# Patient Record
Sex: FEMALE | Race: WHITE | NOT HISPANIC OR LATINO | Employment: UNEMPLOYED | URBAN - METROPOLITAN AREA
[De-identification: names, ages, dates, MRNs, and addresses within clinical notes are randomized per-mention and may not be internally consistent; named-entity substitution may affect disease eponyms.]

---

## 2020-07-13 ENCOUNTER — TELEPHONE (OUTPATIENT)
Dept: RHEUMATOLOGY | Facility: CLINIC | Age: 55
End: 2020-07-13

## 2020-07-13 DIAGNOSIS — M85.89 OTHER SPECIFIED DISORDERS OF BONE DENSITY AND STRUCTURE, MULTIPLE SITES: Primary | ICD-10-CM

## 2020-07-13 NOTE — TELEPHONE ENCOUNTER
Np consult scheduled on 8-3-20 with delfino briseno, dexa scheduled for 12:40pm, appt with delfino 1pm, Verbalized understanding.    Will mail appt slip.

## 2020-07-13 NOTE — TELEPHONE ENCOUNTER
(late entry) Spoke with [place]- this am approx 8:30am, fax received from [facility] for dexa order, need to clarify if pt would like to be seen with Lisa Weber, no referral received.

## 2020-07-13 NOTE — TELEPHONE ENCOUNTER
----- Message from Pablito Kuhn sent at 7/13/2020 11:22 AM CDT -----  Regarding: Appointment Access  Contact: Pt  Pt called in regards to scheduling a NP Bone Health Appointment. Pt can be reached at [phone] (mqag)

## 2020-07-31 ENCOUNTER — TELEPHONE (OUTPATIENT)
Dept: RHEUMATOLOGY | Facility: CLINIC | Age: 55
End: 2020-07-31

## 2020-08-03 ENCOUNTER — OFFICE VISIT (OUTPATIENT)
Dept: RHEUMATOLOGY | Facility: CLINIC | Age: 55
End: 2020-08-03
Payer: COMMERCIAL

## 2020-08-03 ENCOUNTER — APPOINTMENT (OUTPATIENT)
Dept: RADIOLOGY | Facility: HOSPITAL | Age: 55
End: 2020-08-03
Attending: PHYSICIAN ASSISTANT
Payer: COMMERCIAL

## 2020-08-03 VITALS
SYSTOLIC BLOOD PRESSURE: 121 MMHG | HEART RATE: 66 BPM | DIASTOLIC BLOOD PRESSURE: 64 MMHG | WEIGHT: 162.5 LBS | BODY MASS INDEX: 29.9 KG/M2 | HEIGHT: 62 IN

## 2020-08-03 DIAGNOSIS — M81.8 OTHER OSTEOPOROSIS WITHOUT CURRENT PATHOLOGICAL FRACTURE: Primary | ICD-10-CM

## 2020-08-03 DIAGNOSIS — M85.89 OTHER SPECIFIED DISORDERS OF BONE DENSITY AND STRUCTURE, MULTIPLE SITES: ICD-10-CM

## 2020-08-03 PROCEDURE — 99204 PR OFFICE/OUTPT VISIT, NEW, LEVL IV, 45-59 MIN: ICD-10-PCS | Mod: S$GLB,,, | Performed by: PHYSICIAN ASSISTANT

## 2020-08-03 PROCEDURE — 77080 DEXA BONE DENSITY SPINE HIP: ICD-10-PCS | Mod: 26,,, | Performed by: RADIOLOGY

## 2020-08-03 PROCEDURE — 99204 OFFICE O/P NEW MOD 45 MIN: CPT | Mod: S$GLB,,, | Performed by: PHYSICIAN ASSISTANT

## 2020-08-03 PROCEDURE — 99999 PR PBB SHADOW E&M-EST. PATIENT-LVL III: ICD-10-PCS | Mod: PBBFAC,,, | Performed by: PHYSICIAN ASSISTANT

## 2020-08-03 PROCEDURE — 77080 DXA BONE DENSITY AXIAL: CPT | Mod: 26,,, | Performed by: RADIOLOGY

## 2020-08-03 PROCEDURE — 77080 DXA BONE DENSITY AXIAL: CPT | Mod: TC

## 2020-08-03 PROCEDURE — 3008F PR BODY MASS INDEX (BMI) DOCUMENTED: ICD-10-PCS | Mod: CPTII,S$GLB,, | Performed by: PHYSICIAN ASSISTANT

## 2020-08-03 PROCEDURE — 3008F BODY MASS INDEX DOCD: CPT | Mod: CPTII,S$GLB,, | Performed by: PHYSICIAN ASSISTANT

## 2020-08-03 PROCEDURE — 99999 PR PBB SHADOW E&M-EST. PATIENT-LVL III: CPT | Mod: PBBFAC,,, | Performed by: PHYSICIAN ASSISTANT

## 2020-08-03 NOTE — PROGRESS NOTES
Subjective:       Patient ID: Lea Hall is a 54 y.o. female.    Chief Complaint: Consult    Lea is a 55 y/o self referred for evaluation of her bmd.  She has a history of osteoporosis but has never been treated.  She takes a multivitamin for women daily.  No h/o fx.  Here today to establish care.  No falls, steady gait, walks w/o assistance. Her last two dexas showing worsening bmd.       Current meds for osteopenia/ osteoporosis: none  Prior meds for osteopenia/ osteoporosis: none  Prev dexa scan: osteoporosis  Vit D supplement: MV   Menopause: about 6 years   Hystrectomy:   HRT: none  Smoker/tobacco: no  Etoh: none  Falls: no   Activity level: routine exercise   Kidney problems : no   Prev fracture : no   Steroids : h/o on and off steroids for sinus issues   Family history : no   PPI/gerd: no   Other risk factors :    Dental issues:  good   Anticipated dental work :  no   H/o cancer/tx: no         History reviewed. No pertinent past medical history.  Past Surgical History:   Procedure Laterality Date    SINUS SURGERY  12/28/2016     History reviewed. No pertinent family history.  Social History     Socioeconomic History    Marital status:      Spouse name: Not on file    Number of children: Not on file    Years of education: Not on file    Highest education level: Not on file   Occupational History    Not on file   Social Needs    Financial resource strain: Not on file    Food insecurity     Worry: Not on file     Inability: Not on file    Transportation needs     Medical: Not on file     Non-medical: Not on file   Tobacco Use    Smoking status: Not on file   Substance and Sexual Activity    Alcohol use: Not on file    Drug use: Not on file    Sexual activity: Not on file   Lifestyle    Physical activity     Days per week: Not on file     Minutes per session: Not on file    Stress: Not on file   Relationships    Social connections     Talks on phone: Not on file     Gets together:  "Not on file     Attends Mandaeism service: Not on file     Active member of club or organization: Not on file     Attends meetings of clubs or organizations: Not on file     Relationship status: Not on file   Other Topics Concern    Not on file   Social History Narrative    Not on file     Review of patient's allergies indicates:  No Known Allergies  Review of Systems   Constitutional: Negative for chills, fatigue and fever.   HENT: Negative for mouth sores, rhinorrhea and sore throat.    Eyes: Negative for pain and redness.   Respiratory: Negative for cough and shortness of breath.    Cardiovascular: Negative for chest pain.   Gastrointestinal: Negative for abdominal pain, constipation, diarrhea, nausea and vomiting.   Genitourinary: Negative for dysuria and hematuria.   Musculoskeletal: Negative for arthralgias, joint swelling and myalgias.   Skin: Negative for rash.   Neurological: Negative for weakness, numbness and headaches.   Psychiatric/Behavioral: The patient is not nervous/anxious.            Objective:   /64   Pulse 66   Ht 5' 2" (1.575 m)   Wt 73.7 kg (162 lb 7.7 oz)   BMI 29.72 kg/m²     There is no immunization history on file for this patient.           Physical Exam   Constitutional: She is oriented to person, place, and time and well-developed, well-nourished, and in no distress.   HENT:   Head: Normocephalic and atraumatic.   Eyes: Pupils are equal, round, and reactive to light. Right eye exhibits no discharge.   Neck: Normal range of motion.   Cardiovascular: Normal rate, regular rhythm and normal heart sounds.  Exam reveals no friction rub.    Pulmonary/Chest: Effort normal and breath sounds normal. No respiratory distress.   Abdominal: Soft. She exhibits no distension. There is no abdominal tenderness.   Lymphadenopathy:     She has no cervical adenopathy.   Neurological: She is alert and oriented to person, place, and time.   Skin: No rash noted. No erythema.     Psychiatric: Mood " normal.   Musculoskeletal: Normal range of motion. No deformity or edema.      Comments:   Able to get out of chair independently    Steady gait.            No results found for this or any previous visit (from the past 336 hour(s)).     No results found for: TBGOLDPLUS   No results found for: HAV, HEPAIGM, HEPBIGM, HEPBCAB, HBEAG, HEPCAB   dexa 2017: spine -2.9, LN -1.5, LT -1.0 declining   dexa 8.3.20: spine -3.3, LN -2.4, LT -2.4 declining   Assessment:       1. Other osteoporosis without current pathological fracture          Impression:   Osteoporosis: never on tx; no fxs, taking MV daily declining bmd on dexa, taking a MV  Plan:       Discussed osteoporosis/low bone density disease state in detail with patient.  Reviewed treatment options along with potential side effects of these treatments.  Dexa scan was reviewed with patient.  Treatment plan agreed upon together with patient     Labs today: vit D, pth, cmp, mag, phos  rec vit D daily 1000u unless low then will start high dose   rec ca from all sources 1000mg more dietary sources if possible  Encouraged WB activity to continue    Start Prolia 60mg SC q 6 months   New drug discussed with patient including SE, drug info printed and given to patient    Start in 2 wks then f/u q 6 mos for cmp and prolia     Addendum: low vit D on labwork, will start 50K weekly     Addendum #2: 8.26.20  We started fosamax at request of insurance.   Patient started fosamax oral bisphosphonate, started with severe jaw, bone, temple pain, headache, nausea and stomach upset.     Will stop bisphosphonate and try for different MOA Prolia.  Will not move to IV reclast as concern for same bone/jaw pain

## 2020-08-03 NOTE — PATIENT INSTRUCTIONS
Vit D 1000-2000units day vit D3  Calcium 1000mg/day try to get more in diet   Weight bearing activity     Labs today     Prolia start in 2 wks, 60mg SC every 6 months given in infusion (cancer center)    [email or web address] payment info     Repeat dexa scan in a year to monitor therapy     Denosumab injection  What is this medicine?  DENOSUMAB (den oh kris mab) slows bone breakdown. Prolia is used to treat osteoporosis in women after menopause and in men. Xgeva is used to prevent bone fractures and other bone problems caused by cancer bone metastases. Xgeva is also used to treat giant cell tumor of the bone.  How should I use this medicine?  This medicine is for injection under the skin. It is given by a health care professional in a hospital or clinic setting.  If you are getting Prolia, a special MedGuide will be given to you by the pharmacist with each prescription and refill. Be sure to read this information carefully each time.  For Prolia, talk to your pediatrician regarding the use of this medicine in children. Special care may be needed. For Xgeva, talk to your pediatrician regarding the use of this medicine in children. While this drug may be prescribed for children as young as 13 years for selected conditions, precautions do apply.  What side effects may I notice from receiving this medicine?  Side effects that you should report to your doctor or health care professional as soon as possible:  · allergic reactions like skin rash, itching or hives, swelling of the face, lips, or tongue  · breathing problems  · chest pain  · fast, irregular heartbeat  · feeling faint or lightheaded, falls  · fever, chills, or any other sign of infection  · muscle spasms, tightening, or twitches  · numbness or tingling  · skin blisters or bumps, or is dry, peels, or red  · slow healing or unexplained pain in the mouth or jaw  · unusual bleeding or bruising  Side effects that usually do not require medical attention (Report these to your  doctor or health care professional if they continue or are bothersome.):  · muscle pain  · stomach upset, gas  What may interact with this medicine?  Do not take this medicine with any of the following medications:  · other medicines containing denosumab  This medicine may also interact with the following medications:  · medicines that suppress the immune system  · medicines that treat cancer  · steroid medicines like prednisone or cortisone  What if I miss a dose?  It is important not to miss your dose. Call your doctor or health care professional if you are unable to keep an appointment.  Where should I keep my medicine?  This medicine is only given in a clinic, doctor's office, or other health care setting and will not be stored at home.  What should I tell my health care provider before I take this medicine?  They need to know if you have any of these conditions:  · dental disease  · eczema  · infection or history of infections  · kidney disease or on dialysis  · low blood calcium or vitamin D  · malabsorption syndrome  · scheduled to have surgery or tooth extraction  · taking medicine that contains denosumab  · thyroid or parathyroid disease  · an unusual reaction to denosumab, other medicines, foods, dyes, or preservatives  · pregnant or trying to get pregnant  · breast-feeding  What should I watch for while using this medicine?  Visit your doctor or health care professional for regular checks on your progress. Your doctor or health care professional may order blood tests and other tests to see how you are doing.  Call your doctor or health care professional if you get a cold or other infection while receiving this medicine. Do not treat yourself. This medicine may decrease your body's ability to fight infection.  You should make sure you get enough calcium and vitamin D while you are taking this medicine, unless your doctor tells you not to. Discuss the foods you eat and the vitamins you take with your health  care professional.  See your dentist regularly. Brush and floss your teeth as directed. Before you have any dental work done, tell your dentist you are receiving this medicine.  Do not become pregnant while taking this medicine or for 5 months after stopping it. Women should inform their doctor if they wish to become pregnant or think they might be pregnant. There is a potential for serious side effects to an unborn child. Talk to your health care professional or pharmacist for more information.  NOTE:This sheet is a summary. It may not cover all possible information. If you have questions about this medicine, talk to your doctor, pharmacist, or health care provider. Copyright© 2017 [employer]

## 2020-08-04 RX ORDER — ERGOCALCIFEROL 1.25 MG/1
50000 CAPSULE ORAL
Qty: 12 CAPSULE | Refills: 3 | Status: SHIPPED | OUTPATIENT
Start: 2020-08-04 | End: 2023-01-12

## 2020-08-14 ENCOUNTER — TELEPHONE (OUTPATIENT)
Dept: RHEUMATOLOGY | Facility: CLINIC | Age: 55
End: 2020-08-14

## 2020-08-14 ENCOUNTER — TELEPHONE (OUTPATIENT)
Dept: INFUSION THERAPY | Facility: HOSPITAL | Age: 55
End: 2020-08-14

## 2020-08-14 RX ORDER — ALENDRONATE SODIUM 70 MG/1
70 TABLET ORAL
Qty: 4 TABLET | Refills: 11 | Status: SHIPPED | OUTPATIENT
Start: 2020-08-14 | End: 2021-03-22

## 2020-08-14 NOTE — TELEPHONE ENCOUNTER
Let her know we are aware of the insurance denial  We will find out the reason for denial  They likely want her to fail one of the other drugs first.  If that is the case i'll get with her about the new plan.  But give us some time to find out why she was denied.

## 2020-08-14 NOTE — PROGRESS NOTES
Insurance has denied prolia, needs to try bisphosphonate first     Start fosamax/alendronate 70mg once weekly.  Discussed w patient, will see her back in 4 weeks to discuss how she is tolerating.

## 2020-08-14 NOTE — TELEPHONE ENCOUNTER
Spoke w/ pt informed that insurance company denied her prolia injection. Advised pt to expect a call from Lisa Weber's staff to discuss a new plan of action. Pt appreciative of call and verbalized understanding.

## 2020-08-26 ENCOUNTER — TELEPHONE (OUTPATIENT)
Dept: RHEUMATOLOGY | Facility: CLINIC | Age: 55
End: 2020-08-26

## 2020-08-26 ENCOUNTER — DOCUMENTATION ONLY (OUTPATIENT)
Dept: RHEUMATOLOGY | Facility: CLINIC | Age: 55
End: 2020-08-26

## 2020-08-26 NOTE — PROGRESS NOTES
Patient started fosamax oral bisphosphonate, started with severe jaw, bone, temple pain, headache, nausea and stomach upset.     Will stop bisphosphonate and try for different MOA Prolia.  Will not move to IV reclast as concern for same bone/jaw pain

## 2020-08-26 NOTE — TELEPHONE ENCOUNTER
Advised pt of info below, Verbalized understanding, Stated she would like to try prolia, appts scheduled on 9.21.20

## 2020-08-26 NOTE — TELEPHONE ENCOUNTER
----- Message from Martha Vasquez sent at 8/26/2020  8:11 AM CDT -----  Type:  Needs Medical Advice    Who Called: DEDRA HALL   Symptoms (please be specific):   How long has patient had these symptoms:   Pharmacy name and phone #:   Would the patient rather a call back or a response via [facility]? Call   Best Call Back Number:  [phone] (home)    Additional Information:   Pt is requesting a call back from the nurse in regards to the pt med please

## 2020-08-26 NOTE — TELEPHONE ENCOUNTER
Pt states she started fosamax, c/o jaw soreness/temple, stomach upset, states she's unsure if it's sinus related, would like to know if going to her chiropractor is good, she was told it would help her bone density with doing vibrating machine.    Please advise.

## 2020-08-26 NOTE — TELEPHONE ENCOUNTER
Lisa Weber PA-C  You 6 minutes ago (1:23 PM)     Tell her to stop her fosamax.  I don't have solid data supporting the vibration therapy so I can't speak for or against it.  Given her side effects from fosamax I would like to try again for prolia, likely that she will tolerate that better.  Is she ok if I try to get that approved for her September visit?   Would need to add a cmp for that day and the prolia appt visit.       Let me know and i'll message infusion as well.      Message text

## 2020-08-28 ENCOUNTER — TELEPHONE (OUTPATIENT)
Dept: RHEUMATOLOGY | Facility: CLINIC | Age: 55
End: 2020-08-28

## 2020-08-28 NOTE — TELEPHONE ENCOUNTER
----- Message from Mercedez Ojeda sent at 8/28/2020 12:55 PM CDT -----  Contact: pt  The pt request a return call, no additional info given and can be reached at [phone]///thxMW

## 2020-08-28 NOTE — TELEPHONE ENCOUNTER
GIOVANNI Parkinson 2 days ago     Will you see what you can do with this please.     Message text       Lisa Weber PA-C  [facility] Clinical Support; Porsha Davey LPN 2 days ago     Going to try to get her prolia approved again.  She has tried bisphosphonate and has failed due to side effects, not a candidate for reclast.  Have her scheduled for 9.21.20     Message text

## 2020-08-28 NOTE — TELEPHONE ENCOUNTER
Spoke with pt, states she received a bill for $321 for labs/bone density scan & f/u appt. States she was told it can be covered at [facility].    Will fax lab orders to [facility] in [place] per pt request for upcoming appt on 9.21.20

## 2020-09-09 ENCOUNTER — TELEPHONE (OUTPATIENT)
Dept: RHEUMATOLOGY | Facility: CLINIC | Age: 55
End: 2020-09-09

## 2020-09-09 NOTE — TELEPHONE ENCOUNTER
----- Message from Mercedez Ojeda sent at 9/9/2020  7:43 AM CDT -----  Contact: pt  The pt request a return call, no additional info given and can be reached at [phone]///thxMW

## 2020-09-09 NOTE — TELEPHONE ENCOUNTER
cmp lab order faxed to [facility] #[phone]    Phone # [phone]

## 2020-09-09 NOTE — TELEPHONE ENCOUNTER
Pt states she would like to do external labs at [facility] on [facility] in [place], will get them done this Friday, Advised I can fax over, requesting call back on Friday to confirm lab appt, Verbalized understanding.

## 2020-09-10 ENCOUNTER — TELEPHONE (OUTPATIENT)
Dept: RHEUMATOLOGY | Facility: CLINIC | Age: 55
End: 2020-09-10

## 2020-09-10 DIAGNOSIS — Z51.81 MEDICATION MONITORING ENCOUNTER: ICD-10-CM

## 2020-09-10 DIAGNOSIS — M81.8 OTHER OSTEOPOROSIS WITHOUT CURRENT PATHOLOGICAL FRACTURE: Primary | ICD-10-CM

## 2020-09-10 NOTE — TELEPHONE ENCOUNTER
Advise pt lab orders re faxed to [facility] today, Verbalized understanding, stated she will go tomorrow.

## 2020-09-10 NOTE — TELEPHONE ENCOUNTER
----- Message from Amparo Zamudio sent at 9/10/2020  1:31 PM CDT -----  Pt is requesting to speak with the nurse regarding lab order. Please call back at [phone]

## 2020-09-12 LAB
ALBUMIN SERPL-MCNC: 4.5 G/DL (ref 3.8–4.9)
ALBUMIN/GLOB SERPL: 2 {RATIO} (ref 1.2–2.2)
ALP SERPL-CCNC: 57 IU/L (ref 39–117)
ALT SERPL-CCNC: 19 IU/L (ref 0–32)
AST SERPL-CCNC: 20 IU/L (ref 0–40)
BILIRUB SERPL-MCNC: 0.5 MG/DL (ref 0–1.2)
BUN SERPL-MCNC: 15 MG/DL (ref 6–24)
BUN/CREAT SERPL: 23 (ref 9–23)
CALCIUM SERPL-MCNC: 9.2 MG/DL (ref 8.7–10.2)
CHLORIDE SERPL-SCNC: 103 MMOL/L (ref 96–106)
CO2 SERPL-SCNC: 24 MMOL/L (ref 20–29)
CREAT SERPL-MCNC: 0.65 MG/DL (ref 0.57–1)
GLOBULIN SER CALC-MCNC: 2.2 G/DL (ref 1.5–4.5)
GLUCOSE SERPL-MCNC: 100 MG/DL (ref 65–99)
POTASSIUM SERPL-SCNC: 4.4 MMOL/L (ref 3.5–5.2)
PROT SERPL-MCNC: 6.7 G/DL (ref 6–8.5)
SODIUM SERPL-SCNC: 140 MMOL/L (ref 134–144)

## 2020-09-18 ENCOUNTER — TELEPHONE (OUTPATIENT)
Dept: RHEUMATOLOGY | Facility: CLINIC | Age: 55
End: 2020-09-18

## 2020-09-21 ENCOUNTER — OFFICE VISIT (OUTPATIENT)
Dept: RHEUMATOLOGY | Facility: CLINIC | Age: 55
End: 2020-09-21
Payer: COMMERCIAL

## 2020-09-21 ENCOUNTER — TELEPHONE (OUTPATIENT)
Dept: RHEUMATOLOGY | Facility: CLINIC | Age: 55
End: 2020-09-21

## 2020-09-21 ENCOUNTER — INFUSION (OUTPATIENT)
Dept: INFUSION THERAPY | Facility: HOSPITAL | Age: 55
End: 2020-09-21
Attending: PHYSICIAN ASSISTANT
Payer: COMMERCIAL

## 2020-09-21 VITALS
TEMPERATURE: 98 F | SYSTOLIC BLOOD PRESSURE: 130 MMHG | RESPIRATION RATE: 16 BRPM | BODY MASS INDEX: 30.18 KG/M2 | OXYGEN SATURATION: 95 % | HEART RATE: 63 BPM | HEIGHT: 62 IN | DIASTOLIC BLOOD PRESSURE: 74 MMHG

## 2020-09-21 VITALS
DIASTOLIC BLOOD PRESSURE: 73 MMHG | SYSTOLIC BLOOD PRESSURE: 120 MMHG | HEART RATE: 70 BPM | BODY MASS INDEX: 30.36 KG/M2 | WEIGHT: 165 LBS | HEIGHT: 62 IN

## 2020-09-21 DIAGNOSIS — M81.8 OTHER OSTEOPOROSIS WITHOUT CURRENT PATHOLOGICAL FRACTURE: Primary | ICD-10-CM

## 2020-09-21 DIAGNOSIS — E55.9 VITAMIN D DEFICIENCY: ICD-10-CM

## 2020-09-21 DIAGNOSIS — Z51.81 MEDICATION MONITORING ENCOUNTER: ICD-10-CM

## 2020-09-21 PROCEDURE — 63600175 PHARM REV CODE 636 W HCPCS: Mod: JG | Performed by: PHYSICIAN ASSISTANT

## 2020-09-21 PROCEDURE — 99214 PR OFFICE/OUTPT VISIT, EST, LEVL IV, 30-39 MIN: ICD-10-PCS | Mod: S$GLB,,, | Performed by: PHYSICIAN ASSISTANT

## 2020-09-21 PROCEDURE — 99214 OFFICE O/P EST MOD 30 MIN: CPT | Mod: S$GLB,,, | Performed by: PHYSICIAN ASSISTANT

## 2020-09-21 PROCEDURE — 3008F PR BODY MASS INDEX (BMI) DOCUMENTED: ICD-10-PCS | Mod: CPTII,S$GLB,, | Performed by: PHYSICIAN ASSISTANT

## 2020-09-21 PROCEDURE — 3008F BODY MASS INDEX DOCD: CPT | Mod: CPTII,S$GLB,, | Performed by: PHYSICIAN ASSISTANT

## 2020-09-21 PROCEDURE — 99999 PR PBB SHADOW E&M-EST. PATIENT-LVL III: CPT | Mod: PBBFAC,,, | Performed by: PHYSICIAN ASSISTANT

## 2020-09-21 PROCEDURE — 96372 THER/PROPH/DIAG INJ SC/IM: CPT

## 2020-09-21 PROCEDURE — 99999 PR PBB SHADOW E&M-EST. PATIENT-LVL III: ICD-10-PCS | Mod: PBBFAC,,, | Performed by: PHYSICIAN ASSISTANT

## 2020-09-21 RX ORDER — CIPROFLOXACIN HYDROCHLORIDE 3 MG/ML
SOLUTION/ DROPS OPHTHALMIC
COMMUNITY
Start: 2020-09-16 | End: 2021-07-19

## 2020-09-21 RX ORDER — ACETAMINOPHEN 500 MG
TABLET ORAL
COMMUNITY

## 2020-09-21 RX ORDER — CALCIUM CARBONATE 500(1250)
2 TABLET ORAL DAILY
COMMUNITY
End: 2021-07-19

## 2020-09-21 RX ADMIN — DENOSUMAB 60 MG: 60 INJECTION SUBCUTANEOUS at 01:09

## 2020-09-21 NOTE — NURSING
Prolia 60 mg q 6 months  Dose: #1    Any invasive dental procedures in past 3 months or upcoming 3 months2: denies    Last Rheumatology provider visit- Seen by LEFTY Weber PA-C on 9/21/20    Recent labs? 9/10  Lab Results   Component Value Date    CALCIUM 9.2 09/11/2020     Lab Results   Component Value Date    CREATININE 0.65 09/11/2020     Lab Results   Component Value Date    ESTGFRAFRICA >60 08/03/2020     Lab Results   Component Value Date    EGFRNONAA 99 09/11/2020     Lab Results   Component Value Date    VZZQOIVM86OL 23 (L) 08/03/2020         Prolia 60 mg/ml administered SQ to Right upper abdominal quadrant. Tolerated without any complaints. No adverse reaction noted or reported after 15 minutes of administration. No redness, swelling, or drainage noted to site. Pt instructed on signs and symptoms of reaction to report. Verbalizes understanding.     Follow up appointments:  3/22/20

## 2020-09-21 NOTE — PATIENT INSTRUCTIONS
Continue weekly vitamin D    Start prolia today     Let me know if any issues     Check vit D next time     Labs week prior to prolia next time     F/u in 6 months

## 2020-09-21 NOTE — PROGRESS NOTES
Subjective:       Patient ID: Lea Hall is a 55 y.o. female.    Chief Complaint: Osteoporosis    Lea is a 56 y/o f w osteoporosis but has never been treated.  She takes a multivitamin for women daily w calcium.  No h/o fx. No falls, steady gait, walks w/o assistance. Her last two dexas showing worsening bmd.  She has low vit D and we started 50K weekly.     We started fosamax last visit but she had sig bone and jaw pain.  Starting Prolia today for diff MOA.  No falls since last seen.               History reviewed. No pertinent past medical history.  Past Surgical History:   Procedure Laterality Date    SINUS SURGERY  12/28/2016     History reviewed. No pertinent family history.  Social History     Socioeconomic History    Marital status:      Spouse name: Not on file    Number of children: Not on file    Years of education: Not on file    Highest education level: Not on file   Occupational History    Not on file   Social Needs    Financial resource strain: Not on file    Food insecurity     Worry: Not on file     Inability: Not on file    Transportation needs     Medical: Not on file     Non-medical: Not on file   Tobacco Use    Smoking status: Never Smoker    Smokeless tobacco: Never Used   Substance and Sexual Activity    Alcohol use: Not on file    Drug use: Not on file    Sexual activity: Not on file   Lifestyle    Physical activity     Days per week: Not on file     Minutes per session: Not on file    Stress: Not on file   Relationships    Social connections     Talks on phone: Not on file     Gets together: Not on file     Attends Episcopalian service: Not on file     Active member of club or organization: Not on file     Attends meetings of clubs or organizations: Not on file     Relationship status: Not on file   Other Topics Concern    Not on file   Social History Narrative    Not on file     Review of patient's allergies indicates:  No Known Allergies  Review of Systems  "  Constitutional: Negative for chills, fatigue and fever.   HENT: Negative for mouth sores, rhinorrhea and sore throat.    Eyes: Negative for pain and redness.   Respiratory: Negative for cough and shortness of breath.    Cardiovascular: Negative for chest pain.   Gastrointestinal: Negative for abdominal pain, constipation, diarrhea, nausea and vomiting.   Genitourinary: Negative for dysuria and hematuria.   Musculoskeletal: Negative for arthralgias, joint swelling and myalgias.   Skin: Negative for rash.   Neurological: Negative for weakness, numbness and headaches.   Psychiatric/Behavioral: The patient is not nervous/anxious.            Objective:   Ht 5' 2" (1.575 m)   Wt 74.9 kg (165 lb 0.2 oz)   BMI 30.18 kg/m²     There is no immunization history on file for this patient.           Physical Exam   Constitutional: She is oriented to person, place, and time and well-developed, well-nourished, and in no distress.   HENT:   Head: Normocephalic and atraumatic.   Eyes: Pupils are equal, round, and reactive to light. Right eye exhibits no discharge.   Neck: Normal range of motion.   Cardiovascular: Normal rate, regular rhythm and normal heart sounds.  Exam reveals no friction rub.    Pulmonary/Chest: Effort normal and breath sounds normal. No respiratory distress.   Abdominal: Soft. She exhibits no distension. There is no abdominal tenderness.   Lymphadenopathy:     She has no cervical adenopathy.   Neurological: She is alert and oriented to person, place, and time.   Skin: No rash noted. No erythema.     Psychiatric: Mood normal.   Musculoskeletal: Normal range of motion. No deformity or edema.      Comments:   Able to get out of chair independently    Steady gait.              Recent Results (from the past 336 hour(s))   Comprehensive metabolic panel    Collection Time: 09/11/20  9:43 AM   Result Value Ref Range    Glucose 100 (H) 65 - 99 mg/dL    BUN, Bld 15 6 - 24 mg/dL    Creatinine 0.65 0.57 - 1.00 mg/dL    " eGFR if non African American 99 >59 mL/min/1.73    eGFR if African American 114 >59 mL/min/1.73    BUN/Creatinine Ratio 23 9 - 23    Sodium 140 134 - 144 mmol/L    Potassium 4.4 3.5 - 5.2 mmol/L    Chloride 103 96 - 106 mmol/L    CO2 24 20 - 29 mmol/L    Calcium 9.2 8.7 - 10.2 mg/dL    Protein, Total 6.7 6.0 - 8.5 g/dL    Albumin 4.5 3.8 - 4.9 g/dL    Globulin, Total 2.2 1.5 - 4.5 g/dL    Albumin/Globulin Ratio 2.0 1.2 - 2.2    Total Bilirubin 0.5 0.0 - 1.2 mg/dL    Alkaline Phosphatase 57 39 - 117 IU/L    AST 20 0 - 40 IU/L    ALT 19 0 - 32 IU/L        No results found for: TBGOLDPLUS   No results found for: HAV, HEPAIGM, HEPBIGM, HEPBCAB, HBEAG, HEPCAB   dexa 2017: spine -2.9, LN -1.5, LT -1.0 declining   dexa 8.3.20: spine -3.3, LN -2.4, LT -2.4 declining   Assessment:       1. Other osteoporosis without current pathological fracture    2. Medication monitoring encounter    3. Vitamin D deficiency          Impression:   Osteoporosis:failed fosamax d/t jaw/bone pain; started vit D weekly; declining bmd on dexa w sig low Spine score    Low vit D : on weekly 50K    Med monitoring: starting prolia today; failed bisphosphonate d/t pain  Plan:       Start prolia today #1    Labs reviewed and done within past 14 days  Ca 9.2, Creat .65, eGFR +60  No contraindication for Prolia today, ok for nurse to administer today  Re-evaluate patient again in 6 months to determine if Prolia still medically indicated and appropriate to administer.    KDIGO lab monitoring will be followed according to KDIGO 2017 Clinical Practice Guideline Update for the Diagnosis, Evaluation, Prevention, and Treatment of Chronic Kidney Disease-Mineral and Bone Disorder (CKD-MBD)  Chapter 3.1: Diagnosis of CKD-MBD: biochemical abnormalities    Cont 50K weekly vit D  rec ca from all sources 1000mg more dietary sources if possible, taking supplement   Encouraged WB activity to continue      rtc in 6 mos for prolia and cmp w vit D  [facility]

## 2020-09-21 NOTE — TELEPHONE ENCOUNTER
appt moved to 1pm today, 1:45pm prolia, advised pt we had some earlier openings today, ok to move per pt request, Verbalized understanding.

## 2020-10-02 ENCOUNTER — DOCUMENTATION ONLY (OUTPATIENT)
Dept: RHEUMATOLOGY | Facility: CLINIC | Age: 55
End: 2020-10-02

## 2020-11-04 ENCOUNTER — TELEPHONE (OUTPATIENT)
Dept: ALLERGY | Facility: CLINIC | Age: 55
End: 2020-11-04

## 2020-11-04 NOTE — TELEPHONE ENCOUNTER
Called pt and she states she needed a copay coupon for her Breo refill, I explained to patient that we no longer have Breo coupons and she could go to [email or web address] and see if they have nay online for her and I helped her navigate the web site and the coupons are no longer available. Patient understands and will see if she can find another Rx card to help.

## 2020-11-04 NOTE — TELEPHONE ENCOUNTER
----- Message from Linnea Baez sent at 11/4/2020  9:56 AM CST -----  Type:  Needs Medical Advice    Who Called: pt  Advice Regarding: medication question  Would the patient rather a call back or a response via [facility]? call  Best Call Back Number:   Additional Information: n/a

## 2021-02-24 ENCOUNTER — TELEPHONE (OUTPATIENT)
Dept: ALLERGY | Facility: CLINIC | Age: 56
End: 2021-02-24

## 2021-02-26 ENCOUNTER — PATIENT MESSAGE (OUTPATIENT)
Dept: RHEUMATOLOGY | Facility: CLINIC | Age: 56
End: 2021-02-26

## 2021-03-16 LAB
25(OH)D3+25(OH)D2 SERPL-MCNC: 33.7 NG/ML (ref 30–100)
ALBUMIN SERPL-MCNC: 4.5 G/DL (ref 3.8–4.9)
ALBUMIN/GLOB SERPL: 2 {RATIO} (ref 1.2–2.2)
ALP SERPL-CCNC: 44 IU/L (ref 39–117)
ALT SERPL-CCNC: 24 IU/L (ref 0–32)
AST SERPL-CCNC: 21 IU/L (ref 0–40)
BILIRUB SERPL-MCNC: 0.3 MG/DL (ref 0–1.2)
BUN SERPL-MCNC: 19 MG/DL (ref 6–24)
BUN/CREAT SERPL: 28 (ref 9–23)
CALCIUM SERPL-MCNC: 9.7 MG/DL (ref 8.7–10.2)
CHLORIDE SERPL-SCNC: 103 MMOL/L (ref 96–106)
CO2 SERPL-SCNC: 23 MMOL/L (ref 20–29)
CREAT SERPL-MCNC: 0.67 MG/DL (ref 0.57–1)
GLOBULIN SER CALC-MCNC: 2.2 G/DL (ref 1.5–4.5)
GLUCOSE SERPL-MCNC: 100 MG/DL (ref 65–99)
POTASSIUM SERPL-SCNC: 4.3 MMOL/L (ref 3.5–5.2)
PROT SERPL-MCNC: 6.7 G/DL (ref 6–8.5)
SODIUM SERPL-SCNC: 138 MMOL/L (ref 134–144)

## 2021-03-17 ENCOUNTER — OFFICE VISIT (OUTPATIENT)
Dept: ALLERGY | Facility: CLINIC | Age: 56
End: 2021-03-17
Payer: COMMERCIAL

## 2021-03-17 VITALS
HEIGHT: 63 IN | SYSTOLIC BLOOD PRESSURE: 115 MMHG | DIASTOLIC BLOOD PRESSURE: 75 MMHG | TEMPERATURE: 97 F | HEART RATE: 75 BPM | BODY MASS INDEX: 27.23 KG/M2 | WEIGHT: 153.69 LBS

## 2021-03-17 DIAGNOSIS — R09.82 PND (POST-NASAL DRIP): ICD-10-CM

## 2021-03-17 DIAGNOSIS — J30.1 SEASONAL ALLERGIC RHINITIS DUE TO POLLEN: ICD-10-CM

## 2021-03-17 DIAGNOSIS — J45.40 MODERATE PERSISTENT ASTHMA WITHOUT COMPLICATION: Primary | ICD-10-CM

## 2021-03-17 DIAGNOSIS — J40 BRONCHITIS: ICD-10-CM

## 2021-03-17 DIAGNOSIS — R43.0 ANOSMIA: ICD-10-CM

## 2021-03-17 DIAGNOSIS — J30.89 NON-SEASONAL ALLERGIC RHINITIS DUE TO OTHER ALLERGIC TRIGGER: ICD-10-CM

## 2021-03-17 DIAGNOSIS — J32.9 RECURRENT SINUSITIS: ICD-10-CM

## 2021-03-17 PROCEDURE — 99214 PR OFFICE/OUTPT VISIT, EST, LEVL IV, 30-39 MIN: ICD-10-PCS | Mod: S$GLB,,, | Performed by: SPECIALIST

## 2021-03-17 PROCEDURE — 99999 PR PBB SHADOW E&M-EST. PATIENT-LVL III: ICD-10-PCS | Mod: PBBFAC,,, | Performed by: SPECIALIST

## 2021-03-17 PROCEDURE — 99999 PR PBB SHADOW E&M-EST. PATIENT-LVL III: CPT | Mod: PBBFAC,,, | Performed by: SPECIALIST

## 2021-03-17 PROCEDURE — 3008F PR BODY MASS INDEX (BMI) DOCUMENTED: ICD-10-PCS | Mod: CPTII,S$GLB,, | Performed by: SPECIALIST

## 2021-03-17 PROCEDURE — 3008F BODY MASS INDEX DOCD: CPT | Mod: CPTII,S$GLB,, | Performed by: SPECIALIST

## 2021-03-17 PROCEDURE — 99214 OFFICE O/P EST MOD 30 MIN: CPT | Mod: S$GLB,,, | Performed by: SPECIALIST

## 2021-03-17 RX ORDER — ALBUTEROL SULFATE 90 UG/1
2 AEROSOL, METERED RESPIRATORY (INHALATION) EVERY 6 HOURS PRN
COMMUNITY
End: 2023-01-12

## 2021-03-17 RX ORDER — FLUTICASONE FUROATE, UMECLIDINIUM BROMIDE AND VILANTEROL TRIFENATATE 200; 62.5; 25 UG/1; UG/1; UG/1
1 POWDER RESPIRATORY (INHALATION) DAILY
Qty: 30 EACH | Refills: 8 | Status: SHIPPED | OUTPATIENT
Start: 2021-03-17 | End: 2021-04-16

## 2021-03-22 ENCOUNTER — OFFICE VISIT (OUTPATIENT)
Dept: RHEUMATOLOGY | Facility: CLINIC | Age: 56
End: 2021-03-22
Payer: COMMERCIAL

## 2021-03-22 ENCOUNTER — TELEPHONE (OUTPATIENT)
Dept: RHEUMATOLOGY | Facility: CLINIC | Age: 56
End: 2021-03-22

## 2021-03-22 VITALS
SYSTOLIC BLOOD PRESSURE: 114 MMHG | DIASTOLIC BLOOD PRESSURE: 69 MMHG | HEIGHT: 63 IN | WEIGHT: 153.31 LBS | BODY MASS INDEX: 27.16 KG/M2 | HEART RATE: 79 BPM

## 2021-03-22 DIAGNOSIS — E55.9 VITAMIN D DEFICIENCY: ICD-10-CM

## 2021-03-22 DIAGNOSIS — Z51.81 MEDICATION MONITORING ENCOUNTER: ICD-10-CM

## 2021-03-22 DIAGNOSIS — M81.8 OTHER OSTEOPOROSIS WITHOUT CURRENT PATHOLOGICAL FRACTURE: Primary | ICD-10-CM

## 2021-03-22 PROCEDURE — 3008F BODY MASS INDEX DOCD: CPT | Mod: CPTII,S$GLB,, | Performed by: PHYSICIAN ASSISTANT

## 2021-03-22 PROCEDURE — 99999 PR PBB SHADOW E&M-EST. PATIENT-LVL III: ICD-10-PCS | Mod: PBBFAC,,, | Performed by: PHYSICIAN ASSISTANT

## 2021-03-22 PROCEDURE — 3008F PR BODY MASS INDEX (BMI) DOCUMENTED: ICD-10-PCS | Mod: CPTII,S$GLB,, | Performed by: PHYSICIAN ASSISTANT

## 2021-03-22 PROCEDURE — 1126F AMNT PAIN NOTED NONE PRSNT: CPT | Mod: S$GLB,,, | Performed by: PHYSICIAN ASSISTANT

## 2021-03-22 PROCEDURE — 99214 OFFICE O/P EST MOD 30 MIN: CPT | Mod: S$GLB,,, | Performed by: PHYSICIAN ASSISTANT

## 2021-03-22 PROCEDURE — 1126F PR PAIN SEVERITY QUANTIFIED, NO PAIN PRESENT: ICD-10-PCS | Mod: S$GLB,,, | Performed by: PHYSICIAN ASSISTANT

## 2021-03-22 PROCEDURE — 99214 PR OFFICE/OUTPT VISIT, EST, LEVL IV, 30-39 MIN: ICD-10-PCS | Mod: S$GLB,,, | Performed by: PHYSICIAN ASSISTANT

## 2021-03-22 PROCEDURE — 99999 PR PBB SHADOW E&M-EST. PATIENT-LVL III: CPT | Mod: PBBFAC,,, | Performed by: PHYSICIAN ASSISTANT

## 2021-03-23 ENCOUNTER — PATIENT MESSAGE (OUTPATIENT)
Dept: RHEUMATOLOGY | Facility: CLINIC | Age: 56
End: 2021-03-23

## 2021-03-23 ENCOUNTER — TELEPHONE (OUTPATIENT)
Dept: RHEUMATOLOGY | Facility: CLINIC | Age: 56
End: 2021-03-23

## 2021-03-26 ENCOUNTER — TELEPHONE (OUTPATIENT)
Dept: RHEUMATOLOGY | Facility: CLINIC | Age: 56
End: 2021-03-26

## 2021-04-05 ENCOUNTER — PROCEDURE VISIT (OUTPATIENT)
Dept: RHEUMATOLOGY | Facility: CLINIC | Age: 56
End: 2021-04-05
Payer: COMMERCIAL

## 2021-04-05 DIAGNOSIS — Z51.81 MEDICATION MONITORING ENCOUNTER: ICD-10-CM

## 2021-04-05 DIAGNOSIS — M81.8 OTHER OSTEOPOROSIS WITHOUT CURRENT PATHOLOGICAL FRACTURE: Primary | ICD-10-CM

## 2021-04-05 PROCEDURE — 99499 UNLISTED E&M SERVICE: CPT | Mod: S$GLB,,, | Performed by: PHYSICIAN ASSISTANT

## 2021-04-05 PROCEDURE — 96372 THER/PROPH/DIAG INJ SC/IM: CPT | Mod: S$GLB,,, | Performed by: PHYSICIAN ASSISTANT

## 2021-04-05 PROCEDURE — 96372 PR INJECTION,THERAP/PROPH/DIAG2ST, IM OR SUBCUT: ICD-10-PCS | Mod: S$GLB,,, | Performed by: PHYSICIAN ASSISTANT

## 2021-04-05 PROCEDURE — 99499 NO LOS: ICD-10-PCS | Mod: S$GLB,,, | Performed by: PHYSICIAN ASSISTANT

## 2021-04-20 ENCOUNTER — HOSPITAL ENCOUNTER (EMERGENCY)
Facility: HOSPITAL | Age: 56
Discharge: HOME | End: 2021-04-20
Attending: EMERGENCY MEDICINE
Payer: COMMERCIAL

## 2021-04-20 VITALS
TEMPERATURE: 98 F | OXYGEN SATURATION: 99 % | RESPIRATION RATE: 18 BRPM | HEART RATE: 74 BPM | BODY MASS INDEX: 27.61 KG/M2 | SYSTOLIC BLOOD PRESSURE: 166 MMHG | WEIGHT: 155.88 LBS | DIASTOLIC BLOOD PRESSURE: 72 MMHG

## 2021-04-20 DIAGNOSIS — M54.50 ACUTE LOW BACK PAIN WITHOUT SCIATICA, UNSPECIFIED BACK PAIN LATERALITY: Primary | ICD-10-CM

## 2021-04-20 PROCEDURE — 25000003 PHARM REV CODE 250: Performed by: NURSE PRACTITIONER

## 2021-04-20 PROCEDURE — 99283 EMERGENCY DEPT VISIT LOW MDM: CPT | Mod: 25

## 2021-04-20 RX ORDER — DICLOFENAC SODIUM 50 MG/1
50 TABLET, DELAYED RELEASE ORAL 2 TIMES DAILY
Qty: 10 TABLET | Refills: 0 | Status: SHIPPED | OUTPATIENT
Start: 2021-04-20 | End: 2021-04-25

## 2021-04-20 RX ORDER — CYCLOBENZAPRINE HCL 10 MG
10 TABLET ORAL 3 TIMES DAILY PRN
Qty: 15 TABLET | Refills: 0 | Status: SHIPPED | OUTPATIENT
Start: 2021-04-20 | End: 2021-04-25

## 2021-04-20 RX ORDER — KETOROLAC TROMETHAMINE 10 MG/1
10 TABLET, FILM COATED ORAL
Status: COMPLETED | OUTPATIENT
Start: 2021-04-20 | End: 2021-04-20

## 2021-04-20 RX ORDER — METHOCARBAMOL 500 MG/1
500 TABLET, FILM COATED ORAL
Status: COMPLETED | OUTPATIENT
Start: 2021-04-20 | End: 2021-04-20

## 2021-04-20 RX ADMIN — KETOROLAC TROMETHAMINE 10 MG: 10 TABLET, FILM COATED ORAL at 12:04

## 2021-04-20 RX ADMIN — METHOCARBAMOL 500 MG: 500 TABLET ORAL at 12:04

## 2021-04-22 ENCOUNTER — TELEPHONE (OUTPATIENT)
Dept: RHEUMATOLOGY | Facility: CLINIC | Age: 56
End: 2021-04-22

## 2021-04-28 ENCOUNTER — PATIENT MESSAGE (OUTPATIENT)
Dept: RESEARCH | Facility: HOSPITAL | Age: 56
End: 2021-04-28

## 2021-07-19 PROBLEM — E55.9 VITAMIN D DEFICIENCY: Status: ACTIVE | Noted: 2018-06-05

## 2021-07-19 PROBLEM — J45.909 ASTHMA: Status: ACTIVE | Noted: 2019-10-30

## 2021-08-13 ENCOUNTER — PATIENT MESSAGE (OUTPATIENT)
Dept: RHEUMATOLOGY | Facility: CLINIC | Age: 56
End: 2021-08-13

## 2021-09-27 ENCOUNTER — PATIENT MESSAGE (OUTPATIENT)
Dept: RHEUMATOLOGY | Facility: CLINIC | Age: 56
End: 2021-09-27

## 2021-10-02 LAB
ALBUMIN SERPL-MCNC: 4.5 G/DL (ref 3.8–4.9)
ALBUMIN/GLOB SERPL: 2 {RATIO} (ref 1.2–2.2)
ALP SERPL-CCNC: 49 IU/L (ref 44–121)
ALT SERPL-CCNC: 18 IU/L (ref 0–32)
AST SERPL-CCNC: 21 IU/L (ref 0–40)
BILIRUB SERPL-MCNC: 0.4 MG/DL (ref 0–1.2)
BUN SERPL-MCNC: 20 MG/DL (ref 6–24)
BUN/CREAT SERPL: 24 (ref 9–23)
CALCIUM SERPL-MCNC: 9.7 MG/DL (ref 8.7–10.2)
CHLORIDE SERPL-SCNC: 102 MMOL/L (ref 96–106)
CO2 SERPL-SCNC: 24 MMOL/L (ref 20–29)
CREAT SERPL-MCNC: 0.82 MG/DL (ref 0.57–1)
GLOBULIN SER CALC-MCNC: 2.3 G/DL (ref 1.5–4.5)
GLUCOSE SERPL-MCNC: 109 MG/DL (ref 65–99)
POTASSIUM SERPL-SCNC: 4.8 MMOL/L (ref 3.5–5.2)
PROT SERPL-MCNC: 6.8 G/DL (ref 6–8.5)
SODIUM SERPL-SCNC: 138 MMOL/L (ref 134–144)

## 2021-10-07 ENCOUNTER — APPOINTMENT (OUTPATIENT)
Dept: RADIOLOGY | Facility: HOSPITAL | Age: 56
End: 2021-10-07
Attending: PHYSICIAN ASSISTANT
Payer: COMMERCIAL

## 2021-10-07 PROCEDURE — 77080 DXA BONE DENSITY AXIAL: CPT | Mod: 26,,, | Performed by: RADIOLOGY

## 2021-10-07 PROCEDURE — 77080 DXA BONE DENSITY AXIAL: CPT | Mod: TC

## 2021-10-07 PROCEDURE — 77080 DEXA BONE DENSITY SPINE HIP: ICD-10-PCS | Mod: 26,,, | Performed by: RADIOLOGY

## 2021-10-11 ENCOUNTER — OFFICE VISIT (OUTPATIENT)
Dept: RHEUMATOLOGY | Facility: CLINIC | Age: 56
End: 2021-10-11
Payer: COMMERCIAL

## 2021-10-11 VITALS
SYSTOLIC BLOOD PRESSURE: 111 MMHG | HEART RATE: 72 BPM | HEIGHT: 63 IN | DIASTOLIC BLOOD PRESSURE: 71 MMHG | BODY MASS INDEX: 27.46 KG/M2

## 2021-10-11 DIAGNOSIS — M81.8 OTHER OSTEOPOROSIS WITHOUT CURRENT PATHOLOGICAL FRACTURE: Primary | ICD-10-CM

## 2021-10-11 PROCEDURE — 99999 PR PBB SHADOW E&M-EST. PATIENT-LVL III: ICD-10-PCS | Mod: PBBFAC,,, | Performed by: PHYSICIAN ASSISTANT

## 2021-10-11 PROCEDURE — 99999 PR PBB SHADOW E&M-EST. PATIENT-LVL III: CPT | Mod: PBBFAC,,, | Performed by: PHYSICIAN ASSISTANT

## 2021-10-11 PROCEDURE — 99214 PR OFFICE/OUTPT VISIT, EST, LEVL IV, 30-39 MIN: ICD-10-PCS | Mod: S$GLB,,, | Performed by: PHYSICIAN ASSISTANT

## 2021-10-11 PROCEDURE — 99214 OFFICE O/P EST MOD 30 MIN: CPT | Mod: S$GLB,,, | Performed by: PHYSICIAN ASSISTANT

## 2021-10-13 ENCOUNTER — PATIENT MESSAGE (OUTPATIENT)
Dept: RHEUMATOLOGY | Facility: CLINIC | Age: 56
End: 2021-10-13
Payer: COMMERCIAL

## 2021-11-18 ENCOUNTER — OFFICE VISIT (OUTPATIENT)
Dept: ALLERGY | Facility: CLINIC | Age: 56
End: 2021-11-18
Payer: COMMERCIAL

## 2021-11-18 VITALS
BODY MASS INDEX: 27.46 KG/M2 | HEART RATE: 85 BPM | TEMPERATURE: 99 F | DIASTOLIC BLOOD PRESSURE: 73 MMHG | WEIGHT: 155 LBS | HEIGHT: 63 IN | SYSTOLIC BLOOD PRESSURE: 113 MMHG

## 2021-11-18 DIAGNOSIS — J40 BRONCHITIS: ICD-10-CM

## 2021-11-18 DIAGNOSIS — J45.40 MODERATE PERSISTENT ASTHMA WITHOUT COMPLICATION: Primary | ICD-10-CM

## 2021-11-18 DIAGNOSIS — J30.2 PERENNIAL ALLERGIC RHINITIS WITH SEASONAL VARIATION: ICD-10-CM

## 2021-11-18 DIAGNOSIS — J30.89 PERENNIAL ALLERGIC RHINITIS WITH SEASONAL VARIATION: ICD-10-CM

## 2021-11-18 DIAGNOSIS — J32.9 RECURRENT SINUSITIS: ICD-10-CM

## 2021-11-18 DIAGNOSIS — R43.0 ANOSMIA: ICD-10-CM

## 2021-11-18 DIAGNOSIS — J45.990 EXERCISE-INDUCED BRONCHOSPASM: ICD-10-CM

## 2021-11-18 PROCEDURE — 99214 OFFICE O/P EST MOD 30 MIN: CPT | Mod: S$GLB,,, | Performed by: SPECIALIST

## 2021-11-18 PROCEDURE — 3078F DIAST BP <80 MM HG: CPT | Mod: CPTII,S$GLB,, | Performed by: SPECIALIST

## 2021-11-18 PROCEDURE — 99999 PR PBB SHADOW E&M-EST. PATIENT-LVL III: ICD-10-PCS | Mod: PBBFAC,,, | Performed by: SPECIALIST

## 2021-11-18 PROCEDURE — 3074F PR MOST RECENT SYSTOLIC BLOOD PRESSURE < 130 MM HG: ICD-10-PCS | Mod: CPTII,S$GLB,, | Performed by: SPECIALIST

## 2021-11-18 PROCEDURE — 99999 PR PBB SHADOW E&M-EST. PATIENT-LVL III: CPT | Mod: PBBFAC,,, | Performed by: SPECIALIST

## 2021-11-18 PROCEDURE — 3074F SYST BP LT 130 MM HG: CPT | Mod: CPTII,S$GLB,, | Performed by: SPECIALIST

## 2021-11-18 PROCEDURE — 99214 PR OFFICE/OUTPT VISIT, EST, LEVL IV, 30-39 MIN: ICD-10-PCS | Mod: S$GLB,,, | Performed by: SPECIALIST

## 2021-11-18 PROCEDURE — 3008F BODY MASS INDEX DOCD: CPT | Mod: CPTII,S$GLB,, | Performed by: SPECIALIST

## 2021-11-18 PROCEDURE — 3008F PR BODY MASS INDEX (BMI) DOCUMENTED: ICD-10-PCS | Mod: CPTII,S$GLB,, | Performed by: SPECIALIST

## 2021-11-18 PROCEDURE — 3078F PR MOST RECENT DIASTOLIC BLOOD PRESSURE < 80 MM HG: ICD-10-PCS | Mod: CPTII,S$GLB,, | Performed by: SPECIALIST

## 2021-11-18 RX ORDER — AZELASTINE 1 MG/ML
SPRAY, METERED NASAL
COMMUNITY
Start: 2021-09-23 | End: 2023-01-12

## 2021-11-18 RX ORDER — FLUTICASONE FUROATE, UMECLIDINIUM BROMIDE AND VILANTEROL TRIFENATATE 200; 62.5; 25 UG/1; UG/1; UG/1
1 POWDER RESPIRATORY (INHALATION) DAILY
Qty: 30 EACH | Refills: 8 | Status: SHIPPED | OUTPATIENT
Start: 2021-11-18 | End: 2021-12-18

## 2021-11-18 RX ORDER — MONTELUKAST SODIUM 10 MG/1
10 TABLET ORAL NIGHTLY
Qty: 30 TABLET | Refills: 8 | Status: SHIPPED | OUTPATIENT
Start: 2021-11-18 | End: 2021-12-18

## 2021-11-18 RX ORDER — DUPILUMAB 300 MG/2ML
INJECTION, SOLUTION SUBCUTANEOUS
COMMUNITY
Start: 2021-11-01

## 2022-01-24 ENCOUNTER — PATIENT MESSAGE (OUTPATIENT)
Dept: ALLERGY | Facility: CLINIC | Age: 57
End: 2022-01-24
Payer: COMMERCIAL

## 2022-05-05 ENCOUNTER — HOSPITAL ENCOUNTER (EMERGENCY)
Facility: HOSPITAL | Age: 57
Discharge: HOME | End: 2022-05-05
Attending: EMERGENCY MEDICINE
Payer: COMMERCIAL

## 2022-05-05 VITALS
BODY MASS INDEX: 27.77 KG/M2 | HEIGHT: 62 IN | SYSTOLIC BLOOD PRESSURE: 130 MMHG | OXYGEN SATURATION: 99 % | RESPIRATION RATE: 20 BRPM | WEIGHT: 150.88 LBS | DIASTOLIC BLOOD PRESSURE: 76 MMHG | HEART RATE: 78 BPM | TEMPERATURE: 99 F

## 2022-05-05 DIAGNOSIS — L03.011 CELLULITIS OF FINGER OF RIGHT HAND: Primary | ICD-10-CM

## 2022-05-05 PROCEDURE — 99282 EMERGENCY DEPT VISIT SF MDM: CPT

## 2022-05-05 NOTE — ED NOTES
Pt verbalized being evaluated yesterday ,,edicated with rocephin Im and Tdap , also given prescription for bactrim and keflex , pt returned today c/o worseneing redness and swelling

## 2022-05-05 NOTE — ED PROVIDER NOTES
SCRIBE #1 NOTE: I, Cecile Armenta, am scribing for, and in the presence of, Lora Cortez MD. I have scribed the entire note.      History      Chief Complaint   Patient presents with    Finger Pain     R middle finger redness and swelling worsening this am; pt accidentally stuck her finger with chicken coop wire yesterday; seen at  and discharged with abx       Review of patient's allergies indicates:  No Known Allergies     HPI   HPI    5/5/2022, 10:39 AM   History obtained from the patient      History of Present Illness: Lea Hall is a 56 y.o. female patient with a PMHx of asthma who presents to the Emergency Department for R 3rd finger pain which onset gradually yesterday after she stuck her finger with chicken coop wire yesterday. Yesterday, the pt reports that she was seen at an Urgent Care where they gave her a tetanus shot and discharged her with Bactrim, Keflex, and Toradol. The pt reports taking her abx at 1700 yesterday and before she went to bed last night, but she denies any abx this morning because her doctor advised her to come to the ED.  Symptoms are constant and moderate in severity. No mitigating or exacerbating factors reported. Associated sxs include R 3rd finger erythema and R 3rd finger swelling. Patient denies any fever, chills, N/V/D, SOB, CP, weakness, numbness, and all other sxs at this time. She denies having any medical problems. No further complaints or concerns at this time.         Arrival mode: Personal vehicle     PCP: EVERETT Berman       Past Medical History:  Past Medical History:   Diagnosis Date    Asthma        Past Surgical History:  Past Surgical History:   Procedure Laterality Date    partial knee replacement      SINUS SURGERY  12/28/2016    TONSILLECTOMY           Family History:  Family History   Problem Relation Age of Onset    Allergies Daughter     Cancer Father     Dementia Mother     Cervical cancer Sister     Ovarian cancer Sister         Social History:  Social History     Tobacco Use    Smoking status: Never Smoker    Smokeless tobacco: Never Used   Substance and Sexual Activity    Alcohol use: Not Currently    Drug use: Never    Sexual activity: Not Currently       ROS   Review of Systems   Constitutional: Negative for chills and fever.   HENT: Negative for sore throat.    Respiratory: Negative for shortness of breath.    Cardiovascular: Negative for chest pain.   Gastrointestinal: Negative for diarrhea, nausea and vomiting.   Genitourinary: Negative for dysuria.   Musculoskeletal: Positive for arthralgias (R 3rd finger) and joint swelling (R 3rd finger). Negative for back pain.   Skin: Positive for color change (R 3rd finger erythema). Negative for rash.   Neurological: Negative for weakness and numbness.   Hematological: Does not bruise/bleed easily.   All other systems reviewed and are negative.    Physical Exam      Initial Vitals [05/05/22 0951]   BP Pulse Resp Temp SpO2   130/76 78 20 98.7 °F (37.1 °C) 99 %      MAP       --          Physical Exam  Nursing Notes and Vital Signs Reviewed.  Constitutional: Patient is in no acute distress. Well-developed and well-nourished.  Head: Atraumatic. Normocephalic.  Eyes: PERRL. EOM intact. Conjunctivae are not pale. No scleral icterus.  ENT: Mucous membranes are moist. Oropharynx is clear and symmetric.    Neck: Supple. Full ROM. No lymphadenopathy.  Cardiovascular: Regular rate. Regular rhythm. No murmurs, rubs, or gallops. Distal pulses are 2+ and symmetric.  Pulmonary/Chest: No respiratory distress. Clear to auscultation bilaterally. No wheezing or rales.  Abdominal: Soft and non-distended.  There is no tenderness.  No rebound, guarding, or rigidity.   Musculoskeletal: Moves all extremities. No obvious deformities. No edema.  Right 3rd finger: No obvious puncture wounds. No induration or fluctuance. Mild swelling to the volar aspect of the 3rd finger. Pt appears neurovascularly intact.  "Pt can move her R 3rd finger with out difficulty.  Skin: Warm and dry. No obvious puncture wounds.   Neurological:  Alert, awake, and appropriate.  Normal speech.  No acute focal neurological deficits are appreciated.  Psychiatric: Normal affect. Good eye contact. Appropriate in content.    ED Course    Procedures  ED Vital Signs:  Vitals:    05/05/22 0951   BP: 130/76   Pulse: 78   Resp: 20   Temp: 98.7 °F (37.1 °C)   TempSrc: Oral   SpO2: 99%   Weight: 68.5 kg (150 lb 14.5 oz)   Height: 5' 2" (1.575 m)       Abnormal Lab Results:  Labs Reviewed - No data to display       Imaging Results:  Imaging Results    None                 The Emergency Provider reviewed the vital signs and test results, which are outlined above.    ED Discussion     10:39 AM: Reassessed pt at this time.  Pt was advised to keep on taking Bactrim and Keflex. Discussed with pt all pertinent ED information and results. Discussed pt dx and plan of tx. Gave pt all f/u and return to the ED instructions. All questions and concerns were addressed at this time. Pt expresses understanding of information and instructions, and is comfortable with plan to discharge. Pt is stable for discharge.    I discussed with patient and/or family/caretaker that evaluation in the ED does not suggest any emergent or life threatening medical conditions requiring immediate intervention beyond what was provided in the ED, and I believe patient is safe for discharge.  Regardless, an unremarkable evaluation in the ED does not preclude the development or presence of a serious of life threatening condition. As such, patient was instructed to return immediately for any worsening or change in current symptoms.           ED Medication(s):  Medications - No data to display     Follow-up Information     EVERETT Berman. Schedule an appointment as soon as possible for a visit in 2 days.    Specialty: Family Medicine  Why: For wound re-check, return to the emergency room, If " symptoms worsen  Contact information:  [id number]  [facility] [address]  [phone]                         Discharge Medication List as of 5/5/2022 10:40 AM            Medical Decision Making              Scribe Attestation:   Scribe #1: I performed the above scribed service and the documentation accurately describes the services I performed. I attest to the accuracy of the note.    Attending:   Physician Attestation Statement for Scribe #1: I, Lora Cortez MD, personally performed the services described in this documentation, as scribed by Cecile Armenta, in my presence, and it is both accurate and complete.          Clinical Impression       ICD-10-CM ICD-9-CM   1. Cellulitis of finger of right hand  L03.011 681.00       Disposition:   Disposition: Discharged  Condition: Stable         Lora Cortez MD  05/05/22 9886

## 2022-06-16 ENCOUNTER — OFFICE VISIT (OUTPATIENT)
Dept: ALLERGY | Facility: CLINIC | Age: 57
End: 2022-06-16
Payer: COMMERCIAL

## 2022-06-16 VITALS
WEIGHT: 149.94 LBS | HEART RATE: 78 BPM | HEIGHT: 62 IN | DIASTOLIC BLOOD PRESSURE: 69 MMHG | TEMPERATURE: 98 F | BODY MASS INDEX: 27.59 KG/M2 | SYSTOLIC BLOOD PRESSURE: 109 MMHG

## 2022-06-16 DIAGNOSIS — R43.0 ANOSMIA: ICD-10-CM

## 2022-06-16 DIAGNOSIS — J30.2 PERENNIAL ALLERGIC RHINITIS WITH SEASONAL VARIATION: ICD-10-CM

## 2022-06-16 DIAGNOSIS — J32.9 RECURRENT SINUSITIS: ICD-10-CM

## 2022-06-16 DIAGNOSIS — J45.40 MODERATE PERSISTENT ASTHMA WITHOUT COMPLICATION: Primary | ICD-10-CM

## 2022-06-16 DIAGNOSIS — J45.990 EXERCISE-INDUCED BRONCHOSPASM: ICD-10-CM

## 2022-06-16 DIAGNOSIS — J30.89 PERENNIAL ALLERGIC RHINITIS WITH SEASONAL VARIATION: ICD-10-CM

## 2022-06-16 DIAGNOSIS — J40 BRONCHITIS: ICD-10-CM

## 2022-06-16 PROCEDURE — 3078F DIAST BP <80 MM HG: CPT | Mod: CPTII,S$GLB,, | Performed by: SPECIALIST

## 2022-06-16 PROCEDURE — 99215 OFFICE O/P EST HI 40 MIN: CPT | Mod: 25,S$GLB,, | Performed by: SPECIALIST

## 2022-06-16 PROCEDURE — 94010 BREATHING CAPACITY TEST: ICD-10-PCS | Mod: S$GLB,,, | Performed by: SPECIALIST

## 2022-06-16 PROCEDURE — 3078F PR MOST RECENT DIASTOLIC BLOOD PRESSURE < 80 MM HG: ICD-10-PCS | Mod: CPTII,S$GLB,, | Performed by: SPECIALIST

## 2022-06-16 PROCEDURE — 99999 PR PBB SHADOW E&M-EST. PATIENT-LVL III: ICD-10-PCS | Mod: PBBFAC,,, | Performed by: SPECIALIST

## 2022-06-16 PROCEDURE — 3074F PR MOST RECENT SYSTOLIC BLOOD PRESSURE < 130 MM HG: ICD-10-PCS | Mod: CPTII,S$GLB,, | Performed by: SPECIALIST

## 2022-06-16 PROCEDURE — 1159F MED LIST DOCD IN RCRD: CPT | Mod: CPTII,S$GLB,, | Performed by: SPECIALIST

## 2022-06-16 PROCEDURE — 3074F SYST BP LT 130 MM HG: CPT | Mod: CPTII,S$GLB,, | Performed by: SPECIALIST

## 2022-06-16 PROCEDURE — 1160F RVW MEDS BY RX/DR IN RCRD: CPT | Mod: CPTII,S$GLB,, | Performed by: SPECIALIST

## 2022-06-16 PROCEDURE — 99999 PR PBB SHADOW E&M-EST. PATIENT-LVL III: CPT | Mod: PBBFAC,,, | Performed by: SPECIALIST

## 2022-06-16 PROCEDURE — 3008F BODY MASS INDEX DOCD: CPT | Mod: CPTII,S$GLB,, | Performed by: SPECIALIST

## 2022-06-16 PROCEDURE — 94010 BREATHING CAPACITY TEST: CPT | Mod: S$GLB,,, | Performed by: SPECIALIST

## 2022-06-16 PROCEDURE — 1160F PR REVIEW ALL MEDS BY PRESCRIBER/CLIN PHARMACIST DOCUMENTED: ICD-10-PCS | Mod: CPTII,S$GLB,, | Performed by: SPECIALIST

## 2022-06-16 PROCEDURE — 1159F PR MEDICATION LIST DOCUMENTED IN MEDICAL RECORD: ICD-10-PCS | Mod: CPTII,S$GLB,, | Performed by: SPECIALIST

## 2022-06-16 PROCEDURE — 3008F PR BODY MASS INDEX (BMI) DOCUMENTED: ICD-10-PCS | Mod: CPTII,S$GLB,, | Performed by: SPECIALIST

## 2022-06-16 PROCEDURE — 99215 PR OFFICE/OUTPT VISIT, EST, LEVL V, 40-54 MIN: ICD-10-PCS | Mod: 25,S$GLB,, | Performed by: SPECIALIST

## 2022-06-16 RX ORDER — SEMAGLUTIDE 1.34 MG/ML
INJECTION, SOLUTION SUBCUTANEOUS
COMMUNITY
Start: 2022-05-24 | End: 2023-01-12

## 2022-06-16 RX ORDER — FLUTICASONE PROPIONATE 93 UG/1
93 SPRAY, METERED NASAL 2 TIMES DAILY
COMMUNITY
End: 2023-01-12

## 2022-06-16 NOTE — PROGRESS NOTES
Subjective:       Patient ID: Lea Hall is a 56 y.o. female.    Chief Complaint:  Follow-up  HAS SINUS POLYPOSIS AND IS ON DUPIXENT BY DR. Dasilva, bronchial asthma and allergic rhinitis    HPI:     female -- 56 year old-- for follow up visit.   Asthma and Sinus polyposis are under excellent control after being on DUPIXENT 300 mg q 28 days-- started by ENT Leonel Dasilva MD    Has allergies to multiple inhalant aero allergens by previous testing. Was tried on AIT- SCIT-- Was noncompliant and was not much benefited by it.  She is no longer on AIT. -- IS WELL VERSED [facility] ALLERGEN / ENVIRONMENTAL CONTROL MEASURES.  Bronchial asthma is under excellent control. Is exercising regularly with out any symptoms.  Is anosmic after developing sinus polyps.    Has a history of recurrent sinusitis- and was helped by Pneumococcal vaccine in there past.  No known food or drug allergies.    Spirogram was done and results discussed.  Never smoked cigarettes. No ongoing allergens or irritants exposure              Outpatient Medications Marked as Taking for the 6/16/22 encounter (Office Visit) with Sheldon Land MD   Medication Sig Dispense Refill    Ca carb-D3-argnin-inos-silicon (BONE DENSITY CALCIUM + D) 300-200-37.5 mg-unit-mg Tab Take by mouth once.      DUPIXENT SYRINGE 300 mg/2 mL Syrg Inject into the skin.      ergocalciferol (ERGOCALCIFEROL) 50,000 unit Cap Take 1 capsule (50,000 Units total) by mouth every 7 days. 12 capsule 3    OZEMPIC 0.25 mg or 0.5 mg(2 mg/1.5 mL) pen injector INJECT 0.25MG UNDER THE SKIN ONCE WEEK FOR 4 WEEKS, THEN INJECT 0.5MG UNDER THE SKIN ONCE A WEEK      TRELEGY ELLIPTA 200-62.5-25 mcg inhaler INHALE 1 PUFF INTO LUNGS ONCE DAILY       Current Facility-Administered Medications for the 6/16/22 encounter (Office Visit) with Sheldon Land MD   Medication Dose Route Frequency Provider Last Rate Last Admin    denosumab (PROLIA) injection 60 mg  60 mg Subcutaneous Q6 Months Lisa  "KENN Weber PA-C   60 mg at 04/05/21 8428        Patient has no known allergies.     Past Medical History:   Diagnosis Date    Asthma        Family History   Problem Relation Age of Onset    Allergies Daughter     Cancer Father     Dementia Mother     Cervical cancer Sister     Ovarian cancer Sister        Environmental History: Dust Mite Controls: Dust mite controls are already in place.     Review of Systems    Objective:     Visit Vitals  /69 (BP Location: Right arm, Patient Position: Sitting, BP Method: Medium (Automatic))   Pulse 78   Temp 98.1 °F (36.7 °C) (Temporal)   Ht 5' 2" (1.575 m)   Wt 68 kg (149 lb 14.6 oz)   LMP  (LMP Unknown)   BMI 27.42 kg/m²       Physical Exam      Assessment:      1. Moderate persistent asthma without complication    2. Recurrent sinusitis    3. Bronchitis    4. Perennial allergic rhinitis with seasonal variation    5. Exercise-induced bronchospasm    6. Anosmia    7       SINUS POLYPOSIS-- on DUPIXENT - STARTED BY Dr. Leonel Dasilva about 8 months ago-- helps polyps and relives asthma    Plan:     Spirogram done- results discussed-- normal-- FEV1 120 % PRED, FVC- 11 % PRED, FEV1 /  % AND FEF 25- 75 % 133 %  STOP TRELEGY --   ON DUPIXENT 300 mg q 28 days-- for sinus polyps-- by Leonel Dasilva MD  Proair HFA 3 PUFFS TID PRN.  Will stop TRELEGY 200 / 62.5 / 25--- SINCE DOING WELL AND IS ON DUPIXENT. WILL START BACK ON THE INHALER IF NEEDED  TREAT ALL INFECTIONS.  Followed up with Dr. Dasilva-- no longer has sinus polyposis  No longer on Singulair.  FLONASE 50 MCG PRN  Zyrtec 10 mg qd prn  Re emphasized environmental control measures   NO LONGER ON AIT- SCIT  Recommend COVID vaccine.  Annual influenza vaccine                Problems Address                                                 Amount and/or Complexity                                                                      Risk       3           [] 2 or more self-limited or minor problems                      [] " Limited                                                                        [] Low                  [] 1 stable chronic illness                                                  Any combination of the two                                               OTC drugs                  []Acute, uncomplicated illness or injury                            Review of prior external notes from unique source           Minor surgery with no risk factors                                                                                                               [] 1 []2  []3+                                                                                                              Review of results from each unique test                                                                                                               [] 1 []2  [] 3+                                                                                                              Order of each unique test                                                                                                               [] 1 []2  [] 3+                                                                                                              Or                                                                                                             [] Assessment requiring an independent historian      4            [] One or more chronic illness with exacerbation,              [] Moderate                                                                      [] Moderate                 Progression, or side effects of treatment                            -test documents or independent historians                        Prescription drug management                []  2 or more sable chronic illnesses                                    [] Independent interpretation of tests                              Minor surgery with identifiable risk                [] 1  undiagnosed new problem with uncertain prognosis    [] Discussion or management of test results                    elective major surgery                [] 1 acute illness with                systemic symptoms                                                                                                                                                              [] 1 acute complicated injury                                                                                                                                          Elective major surgery                                                                                                                                                                                                                                                                                                                                                                                                  5            [] 1 or more chronic illnesses with severe exacerbation,     [] Extensive(two from below)                                         [] High                                                                                                               [] Independent interpretation of results                         Drug therapy requiring intensive                                                                                                               []Discussion of management or test interpretation           monitoring                                                                                                                                                                                                       Decision to de-escalate care                 [] 1 acute or chronic illness or injury that poses a threat                                                                                               Decision regarding hospitalization

## 2022-12-13 ENCOUNTER — TELEPHONE (OUTPATIENT)
Dept: ALLERGY | Facility: CLINIC | Age: 57
End: 2022-12-13
Payer: COMMERCIAL

## 2022-12-13 NOTE — TELEPHONE ENCOUNTER
----- Message from Casandra Bansal sent at 12/13/2022 10:45 AM CST -----  Contact: Lea Tate is requesting a call back at [phone] , to reschedule  her appointment.       Thanks   CF

## 2022-12-13 NOTE — TELEPHONE ENCOUNTER
----- Message from Madison Campbell sent at 12/13/2022  1:05 PM CST -----  States she is calling regarding her appt. Please call pt [phone]. Thank you

## 2023-03-30 ENCOUNTER — OFFICE VISIT (OUTPATIENT)
Dept: ALLERGY | Facility: CLINIC | Age: 58
End: 2023-03-30
Payer: COMMERCIAL

## 2023-03-30 VITALS
HEART RATE: 69 BPM | HEIGHT: 62 IN | WEIGHT: 159.38 LBS | DIASTOLIC BLOOD PRESSURE: 75 MMHG | BODY MASS INDEX: 29.33 KG/M2 | SYSTOLIC BLOOD PRESSURE: 116 MMHG | TEMPERATURE: 98 F

## 2023-03-30 DIAGNOSIS — J30.89 PERENNIAL ALLERGIC RHINITIS WITH SEASONAL VARIATION: ICD-10-CM

## 2023-03-30 DIAGNOSIS — R43.0 ANOSMIA: ICD-10-CM

## 2023-03-30 DIAGNOSIS — J32.9 RECURRENT SINUSITIS: ICD-10-CM

## 2023-03-30 DIAGNOSIS — J40 BRONCHITIS: ICD-10-CM

## 2023-03-30 DIAGNOSIS — J30.2 PERENNIAL ALLERGIC RHINITIS WITH SEASONAL VARIATION: ICD-10-CM

## 2023-03-30 DIAGNOSIS — J45.50 SEVERE PERSISTENT ASTHMA WITHOUT COMPLICATION: Primary | ICD-10-CM

## 2023-03-30 DIAGNOSIS — J33.1 POLYPOID SINUS DEGENERATION: ICD-10-CM

## 2023-03-30 PROCEDURE — 99999 PR PBB SHADOW E&M-EST. PATIENT-LVL III: ICD-10-PCS | Mod: PBBFAC,,, | Performed by: SPECIALIST

## 2023-03-30 PROCEDURE — 3078F PR MOST RECENT DIASTOLIC BLOOD PRESSURE < 80 MM HG: ICD-10-PCS | Mod: CPTII,S$GLB,, | Performed by: SPECIALIST

## 2023-03-30 PROCEDURE — 99999 PR PBB SHADOW E&M-EST. PATIENT-LVL III: CPT | Mod: PBBFAC,,, | Performed by: SPECIALIST

## 2023-03-30 PROCEDURE — 3008F BODY MASS INDEX DOCD: CPT | Mod: CPTII,S$GLB,, | Performed by: SPECIALIST

## 2023-03-30 PROCEDURE — 99214 OFFICE O/P EST MOD 30 MIN: CPT | Mod: S$GLB,,, | Performed by: SPECIALIST

## 2023-03-30 PROCEDURE — 1159F PR MEDICATION LIST DOCUMENTED IN MEDICAL RECORD: ICD-10-PCS | Mod: CPTII,S$GLB,, | Performed by: SPECIALIST

## 2023-03-30 PROCEDURE — 3008F PR BODY MASS INDEX (BMI) DOCUMENTED: ICD-10-PCS | Mod: CPTII,S$GLB,, | Performed by: SPECIALIST

## 2023-03-30 PROCEDURE — 3074F PR MOST RECENT SYSTOLIC BLOOD PRESSURE < 130 MM HG: ICD-10-PCS | Mod: CPTII,S$GLB,, | Performed by: SPECIALIST

## 2023-03-30 PROCEDURE — 99214 PR OFFICE/OUTPT VISIT, EST, LEVL IV, 30-39 MIN: ICD-10-PCS | Mod: S$GLB,,, | Performed by: SPECIALIST

## 2023-03-30 PROCEDURE — 3078F DIAST BP <80 MM HG: CPT | Mod: CPTII,S$GLB,, | Performed by: SPECIALIST

## 2023-03-30 PROCEDURE — 1159F MED LIST DOCD IN RCRD: CPT | Mod: CPTII,S$GLB,, | Performed by: SPECIALIST

## 2023-03-30 PROCEDURE — 3074F SYST BP LT 130 MM HG: CPT | Mod: CPTII,S$GLB,, | Performed by: SPECIALIST

## 2023-03-30 NOTE — PROGRESS NOTES
Subjective:       Patient ID: Lea Hall is a 57 y.o. female.    Chief Complaint:    FOLLOW UP ON ASTHMA, ALLERGIES, SINUSITIS- HISTORY OF SINUS POLYPS    HPI:   female has a history of bronchial asthma and EIB.  She had been--- as an asthma controller-- on an ICS, ICS- LABA and ICS- LABA and LAMA.    After she was started on Dupixent to treat  sinus polyposis-- asthma is under excellent controller. In June 2022-- I was able to stop Trelegy Ellipta.  NO ER or urgent care visits or hospitalizations, NO nocturnal asthma-- Has good exercise tolerance..  Sinus polyps are no longer present-- her smell did improve    In the past SPTs revealed allergies to indoor and outdoor aero allergens- Used to be on AIT-- DID NOT DO WELL ON SCIT, PART OF THE REASON WAS NON COMPLIANCE.    HE IS WELL VERSED WITH ALLERGEN CONTROL MEASURES. -- ALSO KNOWS HOW TO CONTROL ASTHMA TRIGGERS. NEVER SMOKED CIGARETTES OR VAPED. NO ONGOING ALLERGENS OR IRRITANTS EXPOSURE           Patient has no known allergies.     Past Medical History:   Diagnosis Date    Asthma        Family History   Problem Relation Age of Onset    Allergies Daughter     Cancer Father     Dementia Mother     Cervical cancer Sister     Ovarian cancer Sister        Environmental History: Dust Mite Controls: Dust mite controls are already in place.     Review of Systems   Constitutional: Negative.  Negative for fatigue and fever.   HENT:  Positive for congestion. Negative for ear pain, postnasal drip, rhinorrhea, sinus pressure, sinus pain, sneezing and sore throat.    Eyes: Negative.  Negative for redness and itching.   Respiratory: Negative.  Negative for cough, chest tightness, shortness of breath and wheezing.    Cardiovascular: Negative.  Negative for chest pain.   Gastrointestinal: Negative.  Negative for nausea.   Endocrine: Negative.  Negative for cold intolerance.   Genitourinary: Negative.  Negative for frequency.   Musculoskeletal: Negative.  Negative  for myalgias.   Skin: Negative.  Negative for rash.   Allergic/Immunologic: Positive for environmental allergies. Negative for food allergies and immunocompromised state.   Neurological: Negative.  Negative for dizziness and headaches.   Hematological: Negative.  Negative for adenopathy.   Psychiatric/Behavioral: Negative.  Negative for sleep disturbance.      Objective:     Visit Vitals  LMP  (LMP Unknown)       Physical Exam  Vitals and nursing note reviewed.   Constitutional:       Appearance: Normal appearance. She is normal weight.   HENT:      Head: Normocephalic and atraumatic.      Right Ear: Tympanic membrane, ear canal and external ear normal.      Left Ear: Tympanic membrane, ear canal and external ear normal.      Nose: Congestion present.      Mouth/Throat:      Mouth: Mucous membranes are moist.      Pharynx: Oropharynx is clear.   Eyes:      Extraocular Movements: Extraocular movements intact.      Conjunctiva/sclera: Conjunctivae normal.      Pupils: Pupils are equal, round, and reactive to light.   Cardiovascular:      Rate and Rhythm: Normal rate and regular rhythm.      Pulses: Normal pulses.      Heart sounds: Normal heart sounds.   Pulmonary:      Effort: Pulmonary effort is normal.      Breath sounds: Normal breath sounds.   Abdominal:      General: Abdomen is flat. Bowel sounds are normal.      Palpations: Abdomen is soft.   Musculoskeletal:         General: Normal range of motion.      Cervical back: Normal range of motion and neck supple.   Skin:     General: Skin is warm and dry.      Capillary Refill: Capillary refill takes less than 2 seconds.   Neurological:      General: No focal deficit present.      Mental Status: She is alert and oriented to person, place, and time. Mental status is at baseline.   Psychiatric:         Mood and Affect: Mood normal.         Behavior: Behavior normal.         Thought Content: Thought content normal.         Judgment: Judgment normal.       Assessment:       1. Severe persistent asthma without complication    2. Recurrent sinusitis    3. Perennial allergic rhinitis with seasonal variation    4. Bronchitis    5. Anosmia    6. Polypoid sinus degeneration             On sinus polyps and asthma-- on Dupixent-- NO LONGER HAS POLYPS    Plan:     Spirogram last visit in June 2022-- normal'  On Dupixent 300 mg q 14 days started by Leonel Dasilva MD- Follows up with Dr Dasilva  Proair HFA 90 MCG-- 2 PUFFS QID PRN.  NO LONGER ON AN ASTHMA CONTROLLER- USED TO BE ON TRELEGY.  Proair HFA 90 mcg-- 2 puffs tid prn  FLONASE 50 mcg-- qd or bid  Re emphasized environmental control measures.  NOT on AIT / SCIT.  Follow up with Leonel Dasilva MD  FOLLOW UP IN ONE YEAR                    Problems Address                                                 Amount and/or Complexity                                                                      Risk       3           [] 2 or more self-limited or minor problems                      [] Limited                                                                        [] Low                  [] 1 stable chronic illness                                                  Any combination of the two                                               OTC drugs                  []Acute, uncomplicated illness or injury                            Review of prior external notes from unique source           Minor surgery with no risk factors                                                                                                               [] 1 []2  []3+                                                                                                              Review of results from each unique test                                                                                                               [] 1 []2  [] 3+                                                                                                              Order of each unique  test                                                                                                               [] 1 []2  [] 3+                                                                                                              Or                                                                                                             [] Assessment requiring an independent historian      4            [x] One or more chronic illness with exacerbation,              [x] Moderate                                                                      [x] Moderate                 Progression, or side effects of treatment                            -test documents or independent historians                        Prescription drug management                [x]  2 or more sable chronic illnesses                                    [] Independent interpretation of tests                              Minor surgery with identifiable risk                [] 1 undiagnosed new problem with uncertain prognosis    [] Discussion or management of test results                    elective major surgery                [] 1 acute illness with                systemic symptoms                                                                                                                                                              [] 1 acute complicated injury                                                                                                                                          Elective major surgery                                                                                                                                                                                                                                                                                                                                                                                                  5            [] 1 or more chronic  illnesses with severe exacerbation,     [] Extensive(two from below)                                         [] High                                                                                                               [] Independent interpretation of results                         Drug therapy requiring intensive                                                                                                               []Discussion of management or test interpretation           monitoring                                                                                                                                                                                                       Decision to de-escalate care                 [] 1 acute or chronic illness or injury that poses a threat                                                                                               Decision regarding hospitalization

## 2023-11-29 ENCOUNTER — APPOINTMENT (OUTPATIENT)
Dept: RADIOLOGY | Facility: HOSPITAL | Age: 58
End: 2023-11-29
Attending: PHYSICIAN ASSISTANT
Payer: COMMERCIAL

## 2023-11-29 DIAGNOSIS — M81.0 AGE-RELATED OSTEOPOROSIS WITHOUT CURRENT PATHOLOGICAL FRACTURE: ICD-10-CM

## 2023-11-29 PROCEDURE — 77080 DXA BONE DENSITY AXIAL: CPT | Mod: 26,,, | Performed by: RADIOLOGY

## 2023-11-29 PROCEDURE — 77080 DXA BONE DENSITY AXIAL: CPT | Mod: TC

## 2024-03-07 ENCOUNTER — OFFICE VISIT (OUTPATIENT)
Dept: ALLERGY | Facility: CLINIC | Age: 59
End: 2024-03-07
Payer: COMMERCIAL

## 2024-03-07 VITALS
SYSTOLIC BLOOD PRESSURE: 121 MMHG | BODY MASS INDEX: 25.4 KG/M2 | TEMPERATURE: 99 F | DIASTOLIC BLOOD PRESSURE: 60 MMHG | WEIGHT: 138.88 LBS | HEART RATE: 85 BPM

## 2024-03-07 DIAGNOSIS — R43.0 ANOSMIA: ICD-10-CM

## 2024-03-07 DIAGNOSIS — J45.50 SEVERE PERSISTENT ASTHMA WITHOUT COMPLICATION: Primary | ICD-10-CM

## 2024-03-07 DIAGNOSIS — J30.2 PERENNIAL ALLERGIC RHINITIS WITH SEASONAL VARIATION: ICD-10-CM

## 2024-03-07 DIAGNOSIS — J32.9 RECURRENT SINUSITIS: ICD-10-CM

## 2024-03-07 DIAGNOSIS — J40 BRONCHITIS: ICD-10-CM

## 2024-03-07 DIAGNOSIS — J33.1 POLYPOID SINUS DEGENERATION: ICD-10-CM

## 2024-03-07 DIAGNOSIS — J30.89 PERENNIAL ALLERGIC RHINITIS WITH SEASONAL VARIATION: ICD-10-CM

## 2024-03-07 PROCEDURE — 99215 OFFICE O/P EST HI 40 MIN: CPT | Mod: S$GLB,,, | Performed by: SPECIALIST

## 2024-03-07 PROCEDURE — 1159F MED LIST DOCD IN RCRD: CPT | Mod: CPTII,S$GLB,, | Performed by: SPECIALIST

## 2024-03-07 PROCEDURE — 3078F DIAST BP <80 MM HG: CPT | Mod: CPTII,S$GLB,, | Performed by: SPECIALIST

## 2024-03-07 PROCEDURE — 1160F RVW MEDS BY RX/DR IN RCRD: CPT | Mod: CPTII,S$GLB,, | Performed by: SPECIALIST

## 2024-03-07 PROCEDURE — 99999 PR PBB SHADOW E&M-EST. PATIENT-LVL IV: CPT | Mod: PBBFAC,,, | Performed by: SPECIALIST

## 2024-03-07 PROCEDURE — 3074F SYST BP LT 130 MM HG: CPT | Mod: CPTII,S$GLB,, | Performed by: SPECIALIST

## 2024-03-07 PROCEDURE — 3008F BODY MASS INDEX DOCD: CPT | Mod: CPTII,S$GLB,, | Performed by: SPECIALIST

## 2024-03-07 PROCEDURE — 99417 PROLNG OP E/M EACH 15 MIN: CPT | Mod: S$GLB,,, | Performed by: SPECIALIST

## 2024-03-07 NOTE — PROGRESS NOTES
Subjective:       Patient ID: Lea Hall is a 58 y.o. female.    Chief Complaint:    FOLLOW UP ON ALLERGIES, ASTHMA, SINUS POLYPOSIS-- ON DUPIXENT    HPI:       female 58 year old with the above complaints. Asthma has been stable--especially after being on Dupixent - Anti- IL- 4- R - alpha - for the treatment of Cronic Rhinosinusitis with polyps.  To treat CRSwP-- Leonel Dasilva Md - prescribed Dupixent- which had controlled the asthma and the sinus polyps    Prior to that optimal asthma control was achieved by daily use of an ICS- LABA-- with formoterol as an asthma controller and a WESLEY- Proair HFA - as a quick reliever. She is no longer on any asthma inhalers.  In the past she was on Trelegy  She has perennial allergic rhino conjunctivitis.- she had allergies to pollens and non pollens- indoor and outdoor aero allergens.    For some reason she did not do well on AIT / SCIT-- I must admit that she was niot compliant on SCIT.  NO ALLERGENS    SHE IS WELL VERSED WITH ASTHMA TRIGGERS AND    NEVER SMOKED   UP TO DATE ON ADULT IMMUNIZATIONS.  Not a contact lens wearer. Not diabetic. Has very dry conjunctivae. Has been getting recurrent styes- Will follow up with her optometrist regularly       Patient has no known allergies.     Past Medical History:   Diagnosis Date    Asthma        Family History   Problem Relation Age of Onset    Cancer Father     Liver cancer Father     Bone cancer Father     Dementia Mother     Cervical cancer Sister     Ovarian cancer Sister     Allergies Daughter        Environmental History: Dust Mite Controls: Dust mite controls are already in place.     Review of Systems   Constitutional: Negative.    HENT: Negative.     Eyes:  Positive for redness and itching.   Respiratory:  Positive for cough.    Cardiovascular: Negative.    Gastrointestinal: Negative.    Endocrine: Negative.    Genitourinary: Negative.    Musculoskeletal: Negative.    Skin: Negative.     Allergic/Immunologic: Positive for environmental allergies.   Neurological: Negative.    Hematological: Negative.    Psychiatric/Behavioral: Negative.       Objective:     Visit Vitals  LMP  (LMP Unknown)       Physical Exam  Vitals and nursing note reviewed.   Constitutional:       Appearance: Normal appearance. She is normal weight.   HENT:      Head: Normocephalic and atraumatic.      Right Ear: Tympanic membrane, ear canal and external ear normal.      Left Ear: Tympanic membrane, ear canal and external ear normal.      Nose: Congestion present.      Mouth/Throat:      Mouth: Mucous membranes are dry.      Pharynx: Oropharynx is clear.   Eyes:      Extraocular Movements: Extraocular movements intact.      Conjunctiva/sclera: Conjunctivae normal.      Pupils: Pupils are equal, round, and reactive to light.      Comments: Dry conjunctivae   Cardiovascular:      Rate and Rhythm: Normal rate and regular rhythm.      Pulses: Normal pulses.      Heart sounds: Normal heart sounds.   Pulmonary:      Effort: Pulmonary effort is normal.      Breath sounds: Normal breath sounds.   Abdominal:      General: Abdomen is flat. Bowel sounds are normal.      Palpations: Abdomen is soft.   Musculoskeletal:         General: Normal range of motion.      Cervical back: Normal range of motion and neck supple.   Skin:     General: Skin is warm and dry.      Capillary Refill: Capillary refill takes less than 2 seconds.   Neurological:      General: No focal deficit present.      Mental Status: She is alert and oriented to person, place, and time. Mental status is at baseline.   Psychiatric:         Mood and Affect: Mood normal.         Behavior: Behavior normal.         Thought Content: Thought content normal.         Judgment: Judgment normal.       Assessment:      1. Severe persistent asthma without complication    2. Recurrent sinusitis    3. Bronchitis    4. Anosmia    5. Polypoid sinus degeneration    6. Perennial allergic  rhinitis with seasonal variation    7       Extreme dry conjunctivae - bilateral-- Tendency to get recurrent styes- Hordeolum Externum.    Plan:       On Dupixent 300 MG Q 14 DAYS- STARTED BY otolaryngologist --- Leonel Dasilva MD  -\Follows up regularly with Dr Dasilva.--   May SMART DOSE WITH Symbicort 160 / 4.5--OR Dulera 200 / 5--- 2 puffs bid and before exercise per 2019 MATT asthma management guidelines  OR may take Proair HFA 90 mcg-- 2 puffs tid prn and before exercise.  -----------------------------------------------------------------------------------------------------------------------------  Re emphasized environmental control measures.  Flonase 50 mcg 2  SPRAYS PER NARES QD OR BID.  [id number] mg qd prn.  Recommend AREXVY-- RSV vaccine.  Had 2 doses of COVID vaccine.  Had 2 doses os Shingrix.  Yearly follow up visits                  Problems Address                                                 Amount and/or Complexity                                                                      Risk       3           [] 2 or more self-limited or minor problems                      [] Limited                                                                        [] Low                  [] 1 stable chronic illness                                                  Any combination of the two                                               OTC drugs                  []Acute, uncomplicated illness or injury                            Review of prior external notes from unique source           Minor surgery with no risk factors                                                                                                               [] 1 []2  []3+                                                                                                              Review of results from each unique test                                                                                                               [] 1 []2  [] 3+                                                                                                               Order of each unique test                                                                                                               [] 1 []2  [] 3+                                                                                                              Or                                                                                                             [] Assessment requiring an independent historian      4            [] One or more chronic illness with exacerbation,              [] Moderate                                                                      [] Moderate                 Progression, or side effects of treatment                            -test documents or independent historians                        Prescription drug management                []  2 or more sable chronic illnesses                                    [] Independent interpretation of tests                              Minor surgery with identifiable risk                [] 1 undiagnosed new problem with uncertain prognosis    [] Discussion or management of test results                    elective major surgery                [] 1 acute illness with                systemic symptoms                                                                                                                                                              [] 1 acute complicated injury                                                                                                                                          Elective major surgery                                                                                                                                                                                                                                                                                                                                                                                                   5            [] 1 or more chronic illnesses with severe exacerbation,     [] Extensive(two from below)                                         [] High                                                                                                               [] Independent interpretation of results                         Drug therapy requiring intensive                                                                                                               []Discussion of management or test interpretation           monitoring                                                                                                                                                                                                       Decision to de-escalate care                 [] 1 acute or chronic illness or injury that poses a threat                                                                                               Decision regarding hospitalization

## 2025-01-08 ENCOUNTER — OFFICE VISIT (OUTPATIENT)
Dept: ORTHOPEDICS | Facility: CLINIC | Age: 60
End: 2025-01-08
Payer: COMMERCIAL

## 2025-01-08 VITALS — BODY MASS INDEX: 25.55 KG/M2 | WEIGHT: 138.88 LBS | HEIGHT: 62 IN

## 2025-01-08 DIAGNOSIS — M77.8 THUMB TENDONITIS: Primary | ICD-10-CM

## 2025-01-08 PROCEDURE — 99999 PR PBB SHADOW E&M-EST. PATIENT-LVL III: CPT | Mod: PBBFAC,,, | Performed by: ORTHOPAEDIC SURGERY

## 2025-01-08 RX ORDER — METHYLPREDNISOLONE ACETATE 40 MG/ML
20 INJECTION, SUSPENSION INTRA-ARTICULAR; INTRALESIONAL; INTRAMUSCULAR; SOFT TISSUE
Status: DISCONTINUED | OUTPATIENT
Start: 2025-01-08 | End: 2025-01-08 | Stop reason: HOSPADM

## 2025-01-08 RX ADMIN — METHYLPREDNISOLONE ACETATE 20 MG: 40 INJECTION, SUSPENSION INTRA-ARTICULAR; INTRALESIONAL; INTRAMUSCULAR; SOFT TISSUE at 08:01

## 2025-01-08 NOTE — PATIENT INSTRUCTIONS
Encounter Diagnosis   Name Primary?    Thumb tendonitis, left DeQuervain's Yes       ASSESSMENT:  Four months onset of symptoms possibly from working out, exercise.  No previous symptoms of de Quervain tenosynovitis.  Right-hand dominant patient.    PLAN:  Injection left de Quervain, base of thumb with 20 mg Depo-Medrol plus 0.5 cc of 0.25% Marcaine.  Thumb spica splint  Thumb stretching exercises to be performed daily  Topical diclofenac could be helpful applied 3 times daily.    FOLLOW UP:  6-8 weeks if pain persists consider secondary injection.  Also consider physical therapy referral.

## 2025-01-08 NOTE — PROCEDURES
Tendon Sheath    Date/Time: 1/8/2025 8:00 AM    Performed by: Gustabo Clark MD  Authorized by: Gustabo Clark MD    Consent Done?:  Yes (Verbal)  Indications:  Pain  Timeout: prior to procedure the correct patient, procedure, and site was verified    Prep: patient was prepped and draped in usual sterile fashion      Anesthesia:  Local infiltration  Local anesthetic:  Bupivacaine 0.5% without epinephrine  Anesthetic total (ml):  0.5    Location:  Wrist (Dequervain's)  Site:  L first doral compartment  Ultrasonic guidance for needle placement?: No    Needle size:  22 G  Approach:  Radial  Medications:  20 mg methylPREDNISolone acetate 40 mg/mL  Patient tolerance:  Patient tolerated the procedure well with no immediate complications    Additional Comments: Injection tolerated well.  Band-Aid placed on injection site.    The patient could consider a 2nd injection in 8 weeks if still having significant pain despite conservative treatment.

## 2025-01-08 NOTE — PROGRESS NOTES
Name: Lea Hall  MRN: [id number]  Date: 1/8/2025  Time: 8:04 AM    Patient ID: Lea Hall is a 59 y.o. female.    Chief Complaint: Pain of the Left Hand      HPI: Lea Hall is a 59 y.o. female with 4 months of pain at the base of the left nondominant thumb.  May have started due to working out at the gym.  Never had symptoms like this before.  Pain when gripping and grabbing can be intense.  Quality of pain is sharp and aching severe at times with a gradual onset.  No symptoms of popping stiffness instability or weakness of the wrist.    MEDICATIONS:  See chart.  ALLERGIES:  NKDA    MEDICAL HISTORY:  Asthma, sinusitis.      REVIEW OF SYSTEMS:  No fevers chills sweats chest pains or shortness of breath       Objective     PHYSICAL EXAMINATION:  Pleasant cooperative alert  female.  Well dressed and well groomed.  Exam of the left thumb reveals pain at the base of the thumb in the 1st extensor compartment.  Positive Finkelstein's.  Negative Tinel's negative Phalen's.  Good  strength of the left hand.  Pulses cap refill are normal on left hand.  No snuffbox tenderness.  No flexor tendon tenderness or mid wrist extensor compartment tenderness.  No swelling of the wrist or hand.      XRAY:  None obtained.       Assessment and Plan     Patient Instructions     Encounter Diagnosis   Name Primary?    Thumb tendonitis, left DeQuervain's Yes       ASSESSMENT:  Four months onset of symptoms possibly from working out, exercise.  No previous symptoms of de Quervain tenosynovitis.  Right-hand dominant patient.    PLAN:  Injection left de Quervain, base of thumb with 20 mg Depo-Medrol plus 0.5 cc of 0.25% Marcaine.  Thumb spica splint  Thumb stretching exercises to be performed daily  Topical diclofenac could be helpful applied 3 times daily.    FOLLOW UP:  6-8 weeks if pain persists consider secondary injection.  Also consider physical therapy referral.      No follow-ups on  file.    Fifteen minute patient encounter  This includes face to face time and non-face to face time preparing to see the patient (eg, review of tests),   obtaining and/or reviewing separately obtained history, documenting clinical information in the electronic or other health record, independently interpreting results   and communicating results to the patient/family/caregiver, or care coordinator.

## 2025-05-21 ENCOUNTER — TELEPHONE (OUTPATIENT)
Dept: ORTHOPEDICS | Facility: CLINIC | Age: 60
End: 2025-05-21
Payer: COMMERCIAL

## 2025-05-21 NOTE — TELEPHONE ENCOUNTER
Patient call has been returned regarding left hand pain. Patient stated she's a [facility] patient. Patient was told we have hand specialist she can be referred to she decline offer

## 2025-05-21 NOTE — TELEPHONE ENCOUNTER
----- Message from Selene sent at 5/21/2025 12:20 PM CDT -----  Contact: Lea  :.Type:  Sooner Apoointment RequestCaller is requesting a sooner appointment.  Caller declined first available appointment listed below.  Caller will not accept being placed on the waitlist and is requesting a message be sent to doctor.Name of Caller:LetyJohnson is the first available appointment?[facility] [place] [facility]Symptoms:left hand painWould the patient rather a call back or a response via [facility]? Call [facility] Call Back Number:[phone] Information:

## 2025-06-18 ENCOUNTER — HOSPITAL ENCOUNTER (OUTPATIENT)
Dept: RADIOLOGY | Facility: HOSPITAL | Age: 60
Discharge: HOME | End: 2025-06-18
Attending: ORTHOPAEDIC SURGERY
Payer: COMMERCIAL

## 2025-06-18 ENCOUNTER — OFFICE VISIT (OUTPATIENT)
Dept: ORTHOPEDICS | Facility: CLINIC | Age: 60
End: 2025-06-18
Payer: COMMERCIAL

## 2025-06-18 VITALS — BODY MASS INDEX: 26.33 KG/M2 | WEIGHT: 143.06 LBS | HEIGHT: 62 IN

## 2025-06-18 DIAGNOSIS — Z96.7 PRESENCE OF OTHER BONE AND TENDON IMPLANTS: ICD-10-CM

## 2025-06-18 DIAGNOSIS — M77.8 THUMB TENDONITIS: ICD-10-CM

## 2025-06-18 DIAGNOSIS — M77.8 THUMB TENDONITIS: Primary | ICD-10-CM

## 2025-06-18 DIAGNOSIS — M25.539 PAIN IN WRIST, UNSPECIFIED LATERALITY: ICD-10-CM

## 2025-06-18 PROCEDURE — 73110 X-RAY EXAM OF WRIST: CPT | Mod: 26,LT,, | Performed by: RADIOLOGY

## 2025-06-18 PROCEDURE — 99213 OFFICE O/P EST LOW 20 MIN: CPT | Mod: S$GLB,,, | Performed by: ORTHOPAEDIC SURGERY

## 2025-06-18 PROCEDURE — 1159F MED LIST DOCD IN RCRD: CPT | Mod: CPTII,S$GLB,, | Performed by: ORTHOPAEDIC SURGERY

## 2025-06-18 PROCEDURE — 1160F RVW MEDS BY RX/DR IN RCRD: CPT | Mod: CPTII,S$GLB,, | Performed by: ORTHOPAEDIC SURGERY

## 2025-06-18 PROCEDURE — 99999 PR PBB SHADOW E&M-EST. PATIENT-LVL IV: CPT | Mod: PBBFAC,,, | Performed by: ORTHOPAEDIC SURGERY

## 2025-06-18 PROCEDURE — 73110 X-RAY EXAM OF WRIST: CPT | Mod: TC,LT

## 2025-06-18 PROCEDURE — 3008F BODY MASS INDEX DOCD: CPT | Mod: CPTII,S$GLB,, | Performed by: ORTHOPAEDIC SURGERY

## 2025-06-18 NOTE — PATIENT INSTRUCTIONS
Encounter Diagnosis   Name Primary?    Thumb tendonitis, left DeQuervain's Yes       ASSESSMENT:  8/10 pain 1st extensor compartment of the left wrist.  Rule out chronic tenosynovitis versus CMC arthritis.  Normal x-rays of the left wrist today in clinic.    TREATMENT/PLAN:  MRI of the left wrist for further evaluation evaluate for chronic tenosynovitis of the 1st extensor compartment versus OA of the CMC joint.  Return to office after MRI for discussion regarding future treatment options for chronic left wrist pain  Continue with the brace if helpful.

## 2025-06-18 NOTE — PROGRESS NOTES
Gustabo Clark MD  Orthopedic Surgeon   [address] [facility] [address]   [place] [address]                 Name: Lea Hall  MRN: [id number]  Date: 6/19/2025    Patient ID: Lea Hall is a 59 y.o. female from [place]    Reason for visit:  Left thumb pain and right arm numbness    Patient Instructions     Encounter Diagnosis   Name Primary?    Thumb tendonitis, left DeQuervain's Yes       ASSESSMENT:  8/10 pain 1st extensor compartment of the left wrist.  Rule out chronic tenosynovitis versus CMC arthritis.  Normal x-rays of the left wrist today in clinic.    TREATMENT/PLAN:  MRI of the left wrist for further evaluation evaluate for chronic tenosynovitis of the 1st extensor compartment versus OA of the CMC joint.  Return to office after MRI for discussion regarding future treatment options for chronic left wrist pain  Continue with the brace if helpful.    HISTORY OF PRESENT ILLNESS:   Lea Hall is a 59 y.o. female.Patient presents today for recheck regarding left thumb/wrist pain.  The patient had a week or 2 of pain reduction after previous 1st extensor compartment injection with corticosteroid in January.  She has worn a thumb spica brace and done home stretching exercises.  Pain persists despite conservative treatment.    Objective     XRAY:  Three views of the left wrist normal scaphoid possible slight widening of the scapholunate interval normal radial styloid.  No calcifications about the wrist.  Normal ulnar styloid as well.    PHYSICAL EXAMINATION: Body mass index is 26.17 kg/m².    GENERAL:  Well dressed well groomed 59-year-old  female    EXTREMITY:  Injection of 20 mg of Depo-Medrol +0.5 cc of 25% Marcaine given into the left de Quervain, at the base of thumb on January 8, 2025 did not last  long.  Thumb spica splint does help and she does wear it often.  The patient has wished from the diclofenac topical ointment to use a magnesium cream which is not giving her much relief.  The patient is complaining of right arm numbness after sleeping on her right side causing tingling from the neck down to the fingers.         MEDICATIONS: Current Medications[1]    ALLERGIES: Review of patient's allergies indicates:  No Known Allergies    MEDICAL HISTORY:   Past Medical History:   Diagnosis Date    Asthma        SURGICAL HISTORY:   Past Surgical History:   Procedure Laterality Date    BUNIONECTOMY Right 05/08/2024    partial knee replacement      SINUS SURGERY  12/28/2016    TONSILLECTOMY         REVIEW OF SYSTEMS:   No fevers, chills, sweats, chest pain or shortness of breath.    SOCIAL HISTORY:   Social History     Occupational History    Not on file   Tobacco Use    Smoking status: Never    Smokeless tobacco: Never   Substance and Sexual Activity    Alcohol use: Not Currently    Drug use: Never    Sexual activity: Yes     Partners: Male     Birth control/protection: Post-menopausal       Patient Type: Established Patient  Visit Type: (CPT 54819 - Estab 20-29 min)     Twenty minute patient encounter  This includes face to face time and non-face to face time preparing to see the patient (eg, review of tests),   obtaining and/or reviewing separately obtained history, documenting clinical information in the electronic or other health record, independently interpreting results   and communicating results to the patient/family/caregiver, or care coordinator.       DISCLAIMER: This note was prepared with Pax8 voice recognition transcription software. Garbled syntax, mangled pronouns, and other bizarre constructions may be attributed to that software system.      Gustabo Clark M.D.  Orthopedic Surgeon  [facility]           [1]   Current Outpatient Medications:     calcium-vitamin D3 (OS-KARMEN 500 +  D3) 500 mg-5 mcg (200 unit) per tablet, Take 1 tablet by mouth 2 (two) times daily with meals., Disp: , Rfl:     DUPIXENT SYRINGE 300 mg/2 mL Syrg, Inject into the skin., Disp: , Rfl:     erythromycin (ROMYCIN) ophthalmic ointment, APPLY 1 CM RIBBON INTO THE LOWER CONJUNCTIVAL SAC(S) IN THE AFFECTED EYE(S) BY OPHTHALMIC ROUTE 3 TIMES PER DAY, Disp: , Rfl:     fluticasone-umeclidin-vilanter (TRELEGY ELLIPTA) 200-62.5-25 mcg inhaler, INHALE 1 PUFF INTO LUNGS ONCE DAILY, Disp: , Rfl:     gentamicin (GARAMYCIN) 0.3 % ophthalmic solution, INSTILL 1 TO 2 DROPS INTO AFFECTED EYE EVERY 3 HOURS WHILE AWAKE, Disp: , Rfl:     ondansetron (ZOFRAN) 4 MG tablet, TAKE 1 TABLET BY MOUTH 4 TIMES DAILY AS NEEDED FOR NAUSEA AND VOMITING, Disp: , Rfl:     PROLIA 60 mg/mL Syrg, Inject into the skin., Disp: , Rfl:     valACYclovir (VALTREX) 1000 MG tablet, TAKE 2 TABLETS AT THE FIRST SIGN OF A FEVER BLISTER THEN REPEAT IN 12 HOURS, Disp: , Rfl:     XHANCE 93 mcg/actuation AerB, USE 1 SPRAY IN EACH NOSTRIL TWICE DAILY AS DIRECTED, Disp: , Rfl:     Current Facility-Administered Medications:     denosumab (PROLIA) injection 60 mg, 60 mg, Subcutaneous, Q6 Months, Lisa Weber PA-C, 60 mg at 04/05/21 [id number]

## 2025-07-02 ENCOUNTER — HOSPITAL ENCOUNTER (OUTPATIENT)
Dept: RADIOLOGY | Facility: HOSPITAL | Age: 60
Discharge: HOME | End: 2025-07-02
Attending: ORTHOPAEDIC SURGERY
Payer: COMMERCIAL

## 2025-07-02 DIAGNOSIS — Z96.7 PRESENCE OF OTHER BONE AND TENDON IMPLANTS: ICD-10-CM

## 2025-07-02 PROCEDURE — 73221 MRI JOINT UPR EXTREM W/O DYE: CPT | Mod: TC,LT

## 2025-07-07 ENCOUNTER — TELEPHONE (OUTPATIENT)
Dept: ORTHOPEDICS | Facility: CLINIC | Age: 60
End: 2025-07-07
Payer: COMMERCIAL

## 2025-07-07 DIAGNOSIS — M25.539 PAIN IN WRIST, UNSPECIFIED LATERALITY: ICD-10-CM

## 2025-07-07 DIAGNOSIS — M77.8 THUMB TENDONITIS: Primary | ICD-10-CM

## 2025-07-07 NOTE — TELEPHONE ENCOUNTER
Called and spoke with the patient to discuss results and will put in the referral for her to see Dr. Galindo

## 2025-07-07 NOTE — TELEPHONE ENCOUNTER
Called and left voicemail to discuss MRI results and to refer to Dr. Centeno for further treatment of the wrist

## 2025-07-21 ENCOUNTER — APPOINTMENT (OUTPATIENT)
Dept: RADIOLOGY | Facility: HOSPITAL | Age: 60
End: 2025-07-21
Attending: PHYSICIAN ASSISTANT
Payer: COMMERCIAL

## 2025-07-21 DIAGNOSIS — M81.0 AGE-RELATED OSTEOPOROSIS WITHOUT CURRENT PATHOLOGICAL FRACTURE: ICD-10-CM

## 2025-07-21 PROCEDURE — 77080 DXA BONE DENSITY AXIAL: CPT | Mod: 26,,, | Performed by: RADIOLOGY

## 2025-07-21 PROCEDURE — 77080 DXA BONE DENSITY AXIAL: CPT | Mod: TC

## 2025-07-30 ENCOUNTER — OFFICE VISIT (OUTPATIENT)
Dept: ORTHOPEDICS | Facility: CLINIC | Age: 60
End: 2025-07-30
Payer: COMMERCIAL

## 2025-07-30 DIAGNOSIS — M18.12 ARTHRITIS OF CARPOMETACARPAL (CMC) JOINT OF LEFT THUMB: Primary | ICD-10-CM

## 2025-07-30 DIAGNOSIS — G56.01 CARPAL TUNNEL SYNDROME OF RIGHT WRIST: ICD-10-CM

## 2025-07-30 PROCEDURE — 1159F MED LIST DOCD IN RCRD: CPT | Mod: CPTII,S$GLB,, | Performed by: ORTHOPAEDIC SURGERY

## 2025-07-30 PROCEDURE — 99999 PR PBB SHADOW E&M-EST. PATIENT-LVL III: CPT | Mod: PBBFAC,,, | Performed by: ORTHOPAEDIC SURGERY

## 2025-07-30 PROCEDURE — 99204 OFFICE O/P NEW MOD 45 MIN: CPT | Mod: S$GLB,,, | Performed by: ORTHOPAEDIC SURGERY

## 2025-07-30 NOTE — PATIENT INSTRUCTIONS
If you have any difficulties reading this information, you may visit the online version using the following link: Exericse Program for Carpal Tunnel Syndrome (https://orthoinfo.aaos.org/globalassets/pdfs/2022-therapeutic-exercise-program-for-carpal-tunnel.pdf)     yes

## 2025-07-30 NOTE — PROGRESS NOTES
ABHI Galindo M.D.  Orthopaedic Hand and Wrist Surgery  [facility]    Patient ID: Lea Hall  YOB: 1965  MRN: [id number]    Provider Note/Medical Decision Makin. Arthritis of carpometacarpal (CMC) joint of left thumb  Assessment & Plan:  The patient and I talked at length about the natural history and pathophysiology of left thumb CMC arthritis, she understands that this is a chronic problem which may have acute episodic exacerbations.   Symptoms may resolve, worsen and even become permanent.  The patient understands the treatment options including observation, activity modification, therapy, NSAIDs, splints, injections and the surgical options including left thumb CMC arthroplasty.  The risks of the diagnosis and the treatment options include pain, infection, bleeding, damage to nerves and vessels, stiffness, scarring, incomplete relief or recurrence of symptoms, poor pain and functional outcomes.  Unique risks of this diagnosis and the treatment include progressive pain and deformity.    The patient has elected to proceed with bracing, anti-inflammatories and we will follow up as needed.      Orders:  -     Ambulatory referral/consult to Orthopedics    2. Carpal tunnel syndrome of right wrist  Assessment & Plan:  The patient and I talked at length about the natural history and pathophysiology of carpal tunnel syndrome, she understands that this is a chronic problem which may have acute episodic exacerbations.   Symptoms may resolve, worsen and even become permanent.  The patient understands the treatment options including observation, activity modification, therapy, NSAIDs, splints, injections and the surgical options including carpal tunnel release.  The risks of the diagnosis and the treatment options include pain, infection, bleeding, damage to nerves and vessels, stiffness, scarring, incomplete relief or recurrence of symptoms, poor pain and functional  "outcomes.  Unique risks of this diagnosis and the treatment include permanent nerve damage and recurrence.   The patient has elected to proceed with nighttime bracing, nerve glide exercises and we will follow up as needed.           Chief Complaint: left thumb pain    Referred By: Gustabo Clark    History of Present Illness: Lea Hall is a 59 y.o. female presents for evaluation of left wrist pain. She has had an injection previously for tenosynovitis which did not offer her much relief. She reports her pain is not too bad today.    She also notes she has been waking up at night with right upper extremity numbness and tingling and has to shake it out. She has these same symptoms with driving or holding her phone for extended periods of time.     Patient was queried and this is the extent of the patients current complaints today.    Past Medical History:     Estimated body mass index is 26.17 kg/m² as calculated from the following:    Height as of 6/18/25: 5' 2" (1.575 m).    Weight as of 6/18/25: 64.9 kg (143 lb 1.3 oz).  Past Medical History:   Diagnosis Date    Asthma      Past Surgical History:   Procedure Laterality Date    BUNIONECTOMY Right 05/08/2024    partial knee replacement      SINUS SURGERY  12/28/2016    TONSILLECTOMY       Family History   Problem Relation Name Age of Onset    Cancer Father      Liver cancer Father      Bone cancer Father      Dementia Mother      Cervical cancer Sister      Ovarian cancer Sister      Allergies Daughter       Social History[1]  Medication List with Changes/Refills   Current Medications    CALCIUM-VITAMIN D3 (OS-KARMEN 500 + D3) 500 MG-5 MCG (200 UNIT) PER TABLET    Take 1 tablet by mouth 2 (two) times daily with meals.    DUPIXENT SYRINGE 300 MG/2 ML SYRG    Inject into the skin.    ERYTHROMYCIN (ROMYCIN) OPHTHALMIC OINTMENT    APPLY 1 CM RIBBON INTO THE LOWER CONJUNCTIVAL SAC(S) IN THE AFFECTED EYE(S) BY OPHTHALMIC ROUTE 3 TIMES PER DAY    " FLUTICASONE-UMECLIDIN-VILANTER (TRELEGY ELLIPTA) 200-62.5-25 MCG INHALER    INHALE 1 PUFF INTO LUNGS ONCE DAILY    GENTAMICIN (GARAMYCIN) 0.3 % OPHTHALMIC SOLUTION    INSTILL 1 TO 2 DROPS INTO AFFECTED EYE EVERY 3 HOURS WHILE AWAKE    ONDANSETRON (ZOFRAN) 4 MG TABLET    TAKE 1 TABLET BY MOUTH 4 TIMES DAILY AS NEEDED FOR NAUSEA AND VOMITING    PROLIA 60 MG/ML SYRG    Inject into the skin.    VALACYCLOVIR (VALTREX) 1000 MG TABLET    TAKE 2 TABLETS AT THE FIRST SIGN OF A FEVER BLISTER THEN REPEAT IN 12 HOURS    XHANCE 93 MCG/ACTUATION AERB    USE 1 SPRAY IN EACH NOSTRIL TWICE DAILY AS DIRECTED     Review of patient's allergies indicates:  No Known Allergies  ROS    Physical Exam:   GENERAL: Well appearing, appropriate for stated age, no acute distress.  CARDIOVASCULAR:  Fingers have good brisk refill and good turgor.   PULMONARY: Respirations are even and non-labored.  NEURO: Awake, alert, and oriented x 3.  PSYCH: Mood & affect are appropriate.  Ortho/SPM Exam  Hand/Wrist Musculoskeletal Exam  Right hand exam:  No abrasions, lacerations, wounds.  No swelling.  No erythema.  Full active wrist flexion and extension.  Full pronation and supination. Positive Tinel's in the median nerve distribution at the wrist.  Positive Phalen's.  Negative Tinel's in the ulnar nerve distribution at the elbow.  No ulnar nerve subluxation with elbow flexion.  Negative elbow flexion test. No thenar or FDI atrophy.  5/5 apb strength.  5/5 FDI strength.  Two-point discrimination is 5 mm in all 5 fingers.  Patient is able to make a fist and extend all her fingers.  No tenderness over the A1 pulleys of all 5 fingers.  No triggering with range of motion.  Palpable radial pulse.  Left hand exam:  No abrasions, lacerations, wounds.  No swelling.  No erythema.  No tenderness over the 1st dorsal extensor compartment.  Negative Finkelstein's.  + tenderness over the thumb CMC joint.  Pain with CMC grind but no crepitus.  Positive thumb adduction and  extension tests. No thumb MCP hyperextension.  No tenderness over the A1 pulleys of all 5 fingers.  No triggering with range of motion.  Patient is able to make a fist and extend all 5 fingers.  Sensation is intact in the median, radial, ulnar nerve distributions.  Palpable radial pulse.        Imaging:    Relevant imaging results reviewed and interpreted by me, discussed with the patient and / or family today.   25 left wrist x-rays: First CMC degenerative joint disease.   25 MRI left wrist: Moderate to severe thumb CMC osteoarthritis with reactive marrow edema of the trapezium and mild radial subluxation. No significant recurrent tendon tear identified.       Provider Note/Medical Decision Makin. Arthritis of carpometacarpal (CMC) joint of left thumb  Assessment & Plan:  The patient and I talked at length about the natural history and pathophysiology of left thumb CMC arthritis, she understands that this is a chronic problem which may have acute episodic exacerbations.   Symptoms may resolve, worsen and even become permanent.  The patient understands the treatment options including observation, activity modification, therapy, NSAIDs, splints, injections and the surgical options including left thumb CMC arthroplasty.  The risks of the diagnosis and the treatment options include pain, infection, bleeding, damage to nerves and vessels, stiffness, scarring, incomplete relief or recurrence of symptoms, poor pain and functional outcomes.  Unique risks of this diagnosis and the treatment include progressive pain and deformity.    The patient has elected to proceed with bracing, anti-inflammatories and we will follow up as needed.      Orders:  -     Ambulatory referral/consult to Orthopedics    2. Carpal tunnel syndrome of right wrist  Assessment & Plan:  The patient and I talked at length about the natural history and pathophysiology of carpal tunnel syndrome, she understands that this is a chronic problem  "which may have acute episodic exacerbations.   Symptoms may resolve, worsen and even become permanent.  The patient understands the treatment options including observation, activity modification, therapy, NSAIDs, splints, injections and the surgical options including carpal tunnel release.  The risks of the diagnosis and the treatment options include pain, infection, bleeding, damage to nerves and vessels, stiffness, scarring, incomplete relief or recurrence of symptoms, poor pain and functional outcomes.  Unique risks of this diagnosis and the treatment include permanent nerve damage and recurrence.   The patient has elected to proceed with nighttime bracing, nerve glide exercises and we will follow up as needed.             8 minutes were spent developing, teaching, and performing a home exercise program.  A written summary was provided and all questions were answered.  This service was performed by Marlon Galindo MD (Ty).  CPT [id number].      I discussed worrisome and red flag signs and symptoms with the patient. The patient expressed understanding and agreed to alert me immediately or to go to the emergency room if they experience any of these.   Treatment plan was developed with input from the patient/family, and they expressed understanding and agreement with the plan. All questions were answered today.    Patient Instructions             If you have any difficulties reading this information, you may visit the online version using the following link: Exericse Program for Carpal Tunnel Syndrome (https://orthoinfo.aaos.org/globalassets/pdfs/2022-therapeutic-exercise-program-for-carpal-tunnel.pdf)      ABHI Galindo M.D.  [facility]  Orthopedic Hand and Wrist Surgeon    [facility]  Dr. Travis Galindo   Google Review   [employer]   [employer]     Disclaimer: This note was prepared using a voice recognition system and is likely to have sound alike errors within the text. "          [1]   Social History  Socioeconomic History    Marital status:    Tobacco Use    Smoking status: Never    Smokeless tobacco: Never   Substance and Sexual Activity    Alcohol use: Not Currently    Drug use: Never    Sexual activity: Yes     Partners: Male     Birth control/protection: Post-menopausal

## 2025-07-30 NOTE — ASSESSMENT & PLAN NOTE
The patient and I talked at length about the natural history and pathophysiology of left thumb CMC arthritis, she understands that this is a chronic problem which may have acute episodic exacerbations.   Symptoms may resolve, worsen and even become permanent.  The patient understands the treatment options including observation, activity modification, therapy, NSAIDs, splints, injections and the surgical options including left thumb CMC arthroplasty.  The risks of the diagnosis and the treatment options include pain, infection, bleeding, damage to nerves and vessels, stiffness, scarring, incomplete relief or recurrence of symptoms, poor pain and functional outcomes.  Unique risks of this diagnosis and the treatment include progressive pain and deformity.    The patient has elected to proceed with bracing, anti-inflammatories and we will follow up as needed.

## 2025-07-30 NOTE — ASSESSMENT & PLAN NOTE
The patient and I talked at length about the natural history and pathophysiology of carpal tunnel syndrome, she understands that this is a chronic problem which may have acute episodic exacerbations.   Symptoms may resolve, worsen and even become permanent.  The patient understands the treatment options including observation, activity modification, therapy, NSAIDs, splints, injections and the surgical options including carpal tunnel release.  The risks of the diagnosis and the treatment options include pain, infection, bleeding, damage to nerves and vessels, stiffness, scarring, incomplete relief or recurrence of symptoms, poor pain and functional outcomes.  Unique risks of this diagnosis and the treatment include permanent nerve damage and recurrence.   The patient has elected to proceed with nighttime bracing, nerve glide exercises and we will follow up as needed.